# Patient Record
Sex: FEMALE | Race: WHITE | Employment: UNEMPLOYED | URBAN - METROPOLITAN AREA
[De-identification: names, ages, dates, MRNs, and addresses within clinical notes are randomized per-mention and may not be internally consistent; named-entity substitution may affect disease eponyms.]

---

## 2019-08-23 ENCOUNTER — HOSPITAL ENCOUNTER (EMERGENCY)
Age: 29
Discharge: HOME | End: 2019-08-23

## 2019-08-23 VITALS
HEART RATE: 80 BPM | HEIGHT: 64 IN | SYSTOLIC BLOOD PRESSURE: 116 MMHG | BODY MASS INDEX: 25.61 KG/M2 | OXYGEN SATURATION: 98 % | DIASTOLIC BLOOD PRESSURE: 86 MMHG | RESPIRATION RATE: 18 BRPM | TEMPERATURE: 98.1 F | WEIGHT: 150 LBS

## 2019-08-23 DIAGNOSIS — A59.9 TRICHIMONIASIS: Primary | ICD-10-CM

## 2019-08-23 DIAGNOSIS — R31.9 URINARY TRACT INFECTION WITH HEMATURIA, SITE UNSPECIFIED: ICD-10-CM

## 2019-08-23 DIAGNOSIS — N39.0 URINARY TRACT INFECTION WITH HEMATURIA, SITE UNSPECIFIED: ICD-10-CM

## 2019-08-23 LAB
BACTERIA WET PREP: ABNORMAL
BACTERIA: ABNORMAL /HPF
BILIRUBIN URINE: NEGATIVE
BLOOD, URINE: NEGATIVE
CLARITY: CLEAR
CLUE CELLS: ABNORMAL
COLOR: YELLOW
EPITHELIAL CELLS WET PREP: ABNORMAL
EPITHELIAL CELLS, UA: ABNORMAL /HPF
GLUCOSE URINE: NEGATIVE MG/DL
HCG(URINE) PREGNANCY TEST: NEGATIVE
KETONES, URINE: ABNORMAL MG/DL
LEUKOCYTE ESTERASE, URINE: ABNORMAL
MICROSCOPIC EXAMINATION: YES
NITRITE, URINE: NEGATIVE
PH UA: 6.5 (ref 5–8)
PROTEIN UA: NEGATIVE MG/DL
RBC UA: ABNORMAL /HPF (ref 0–2)
RBC WET PREP: ABNORMAL
SOURCE WET PREP: ABNORMAL
SPECIFIC GRAVITY UA: 1.02 (ref 1–1.03)
TRICHOMONAS PREP: ABNORMAL
TRICHOMONAS: PRESENT /HPF
URINE TYPE: ABNORMAL
UROBILINOGEN, URINE: 2 E.U./DL
WBC UA: ABNORMAL /HPF (ref 0–5)
WBC WET PREP: ABNORMAL
YEAST WET PREP: ABNORMAL

## 2019-08-23 PROCEDURE — 6370000000 HC RX 637 (ALT 250 FOR IP): Performed by: PHYSICIAN ASSISTANT

## 2019-08-23 PROCEDURE — 99283 EMERGENCY DEPT VISIT LOW MDM: CPT

## 2019-08-23 PROCEDURE — 96372 THER/PROPH/DIAG INJ SC/IM: CPT

## 2019-08-23 PROCEDURE — 87086 URINE CULTURE/COLONY COUNT: CPT

## 2019-08-23 PROCEDURE — 81001 URINALYSIS AUTO W/SCOPE: CPT

## 2019-08-23 PROCEDURE — 87210 SMEAR WET MOUNT SALINE/INK: CPT

## 2019-08-23 PROCEDURE — 84703 CHORIONIC GONADOTROPIN ASSAY: CPT

## 2019-08-23 PROCEDURE — 6360000002 HC RX W HCPCS: Performed by: PHYSICIAN ASSISTANT

## 2019-08-23 RX ORDER — METRONIDAZOLE 250 MG/1
2000 TABLET ORAL ONCE
Status: COMPLETED | OUTPATIENT
Start: 2019-08-23 | End: 2019-08-23

## 2019-08-23 RX ORDER — AZITHROMYCIN 250 MG/1
1000 TABLET, FILM COATED ORAL ONCE
Status: COMPLETED | OUTPATIENT
Start: 2019-08-23 | End: 2019-08-23

## 2019-08-23 RX ORDER — CEFTRIAXONE 1 G/1
250 INJECTION, POWDER, FOR SOLUTION INTRAMUSCULAR; INTRAVENOUS ONCE
Status: COMPLETED | OUTPATIENT
Start: 2019-08-23 | End: 2019-08-23

## 2019-08-23 RX ORDER — CEPHALEXIN 500 MG/1
500 CAPSULE ORAL 2 TIMES DAILY
Qty: 14 CAPSULE | Refills: 0 | Status: SHIPPED | OUTPATIENT
Start: 2019-08-23 | End: 2019-08-30

## 2019-08-23 RX ORDER — ONDANSETRON 4 MG/1
4 TABLET, ORALLY DISINTEGRATING ORAL EVERY 8 HOURS PRN
Qty: 20 TABLET | Refills: 0 | Status: SHIPPED | OUTPATIENT
Start: 2019-08-23 | End: 2020-02-28

## 2019-08-23 RX ADMIN — CEFTRIAXONE SODIUM 250 MG: 1 INJECTION, POWDER, FOR SOLUTION INTRAMUSCULAR; INTRAVENOUS at 19:19

## 2019-08-23 RX ADMIN — AZITHROMYCIN 1000 MG: 250 TABLET, FILM COATED ORAL at 19:19

## 2019-08-23 RX ADMIN — METRONIDAZOLE 2000 MG: 250 TABLET ORAL at 19:19

## 2019-08-23 ASSESSMENT — PAIN DESCRIPTION - DESCRIPTORS: DESCRIPTORS: PRESSURE

## 2019-08-23 ASSESSMENT — PAIN DESCRIPTION - LOCATION: LOCATION: PELVIS

## 2019-08-23 ASSESSMENT — PAIN SCALES - GENERAL: PAINLEVEL_OUTOF10: 5

## 2019-08-23 ASSESSMENT — PAIN DESCRIPTION - PAIN TYPE: TYPE: ACUTE PAIN

## 2019-08-23 NOTE — ED PROVIDER NOTES
[facility] Emergency Department    CHIEF COMPLAINT  Exposure to STD (pt believes she may have been exposed to an STD. pt reports she has had vaginal odor and discharge x 3 months ); Vaginal Odor; and Vaginal Discharge      HISTORY OF PRESENT ILLNESS  Mook Myers is a 34 y.o. female who presents to the ED complaining of vaginal discharge and odor. Patient observed lying in bed, appears nontoxic and in no acute distress at this time. She is accompanied by fiancé today for evaluation. Patient states that for past several months she has had vaginal discharge and associated smell. Concern for STDs. States that she has had some mild pelvic pain. Denies nausea or vomiting. No diarrhea or constipation. No fevers chills. Last normal menstrual cycle approximately 3 weeks ago. Slight burning with urination. No back pain. No rashes. No chest pain shortness of breath. No headache, lightheadedness, dizziness or confusion. No other complaints, modifying factors or associated symptoms. Nursing notes reviewed. History reviewed. No pertinent past medical history. Past Surgical History:   Procedure Laterality Date    MOUTH SURGERY       History reviewed. No pertinent family history. Social History     Socioeconomic History    Marital status: Single     Spouse name: Not on file    Number of children: Not on file    Years of education: Not on file    Highest education level: Not on file   Occupational History    Not on file   Social Needs    Financial resource strain: Not on file    Food insecurity:     Worry: Not on file     Inability: Not on file    Transportation needs:     Medical: Not on file     Non-medical: Not on file   Tobacco Use    Smoking status: Former Smoker     Packs/day: 1.00     Types: Cigarettes     Last attempt to quit: 8/21/2019    Smokeless tobacco: Never Used   Substance and Sexual Activity    Alcohol use: Not Currently    Drug use:  Yes 2+ (A)     Yeast, Wet Prep None Seen     Clue Cells, Wet Prep None Seen     WBC, Wet Prep 2+     RBC, Wet Prep <1+     Epi Cells 1+     Bacteria 1+     Source Wet Prep Vaginal    Urinalysis   Result Value Ref Range    Color, UA Yellow Straw/Yellow    Clarity, UA Clear Clear    Glucose, Ur Negative Negative mg/dL    Bilirubin Urine Negative Negative    Ketones, Urine TRACE (A) Negative mg/dL    Specific Gravity, UA 1.020 1.005 - 1.030    Blood, Urine Negative Negative    pH, UA 6.5 5.0 - 8.0    Protein, UA Negative Negative mg/dL    Urobilinogen, Urine 2.0 (A) <2.0 E.U./dL    Nitrite, Urine Negative Negative    Leukocyte Esterase, Urine SMALL (A) Negative    Microscopic Examination YES     Urine Type Not Specified    Pregnancy, Urine   Result Value Ref Range    HCG(Urine) Pregnancy Test Negative Detects HCG level >20 MIU/mL   Microscopic Urinalysis   Result Value Ref Range    WBC, UA 6-10 (A) 0 - 5 /HPF    RBC, UA 0-2 0 - 2 /HPF    Epi Cells 3-5 /HPF    Bacteria, UA Rare (A) /HPF    Trichomonas, UA Present (A) /HPF     I estimate there is LOW risk for ACUTE APPENDICITIS, BOWEL OBSTRUCTION, CHOLECYSTITIS, DIVERTICULITIS, INCARCERATED HERNIA, PANCREATITIS, PELVIC INFLAMMATORY DISEASE, PERFORATED BOWEL or ULCER, PREGNANCY, or TUBO-OVARIAN ABSCESS, thus I consider the discharge disposition reasonable. Also, there is no evidence or peritonitis, sepsis, or toxicity. Blossom Poe and I have discussed the diagnosis and risks, and we agree with discharging home to follow-up with their primary doctor. We also discussed returning to the Emergency Department immediately if new or worsening symptoms occur. We have discussed the symptoms which are most concerning (e.g., bloody stool, fever, changing or worsening pain, vomiting) that necessitate immediate return. Final Impression  1. Trichimoniasis    2.  Urinary tract infection with hematuria, site unspecified      Blood pressure 116/86, pulse 80, temperature 98.1 °F

## 2019-08-25 LAB — URINE CULTURE, ROUTINE: NORMAL

## 2020-02-13 ENCOUNTER — HOSPITAL ENCOUNTER (EMERGENCY)
Age: 30
Discharge: HOME | End: 2020-02-13
Attending: EMERGENCY MEDICINE
Payer: COMMERCIAL

## 2020-02-13 VITALS
TEMPERATURE: 97.8 F | RESPIRATION RATE: 18 BRPM | SYSTOLIC BLOOD PRESSURE: 110 MMHG | HEART RATE: 110 BPM | DIASTOLIC BLOOD PRESSURE: 71 MMHG | OXYGEN SATURATION: 98 %

## 2020-02-13 LAB
BILIRUBIN URINE: NEGATIVE
BLOOD, URINE: NEGATIVE
CLARITY: CLEAR
COLOR: YELLOW
GLUCOSE URINE: NEGATIVE MG/DL
HCG(URINE) PREGNANCY TEST: NEGATIVE
KETONES, URINE: NEGATIVE MG/DL
LEUKOCYTE ESTERASE, URINE: NEGATIVE
MICROSCOPIC EXAMINATION: NORMAL
NITRITE, URINE: NEGATIVE
PH UA: 7 (ref 5–8)
PROTEIN UA: NEGATIVE MG/DL
SPECIFIC GRAVITY UA: 1.02 (ref 1–1.03)
URINE REFLEX TO CULTURE: NORMAL
URINE TYPE: NORMAL
UROBILINOGEN, URINE: 1 E.U./DL

## 2020-02-13 PROCEDURE — 6370000000 HC RX 637 (ALT 250 FOR IP): Performed by: EMERGENCY MEDICINE

## 2020-02-13 PROCEDURE — 96372 THER/PROPH/DIAG INJ SC/IM: CPT

## 2020-02-13 PROCEDURE — 84703 CHORIONIC GONADOTROPIN ASSAY: CPT

## 2020-02-13 PROCEDURE — 6360000002 HC RX W HCPCS: Performed by: EMERGENCY MEDICINE

## 2020-02-13 PROCEDURE — 99283 EMERGENCY DEPT VISIT LOW MDM: CPT

## 2020-02-13 PROCEDURE — 81003 URINALYSIS AUTO W/O SCOPE: CPT

## 2020-02-13 PROCEDURE — 2500000003 HC RX 250 WO HCPCS: Performed by: EMERGENCY MEDICINE

## 2020-02-13 RX ORDER — METRONIDAZOLE 500 MG/1
2000 TABLET ORAL ONCE
Qty: 4 TABLET | Refills: 0 | Status: SHIPPED | OUTPATIENT
Start: 2020-02-17 | End: 2020-02-17

## 2020-02-13 RX ORDER — CEFTRIAXONE SODIUM 250 MG/1
INJECTION, POWDER, FOR SOLUTION INTRAMUSCULAR; INTRAVENOUS
Status: DISCONTINUED
Start: 2020-02-13 | End: 2020-02-14 | Stop reason: HOSPADM

## 2020-02-13 RX ORDER — METRONIDAZOLE 250 MG/1
2000 TABLET ORAL ONCE
Status: DISCONTINUED | OUTPATIENT
Start: 2020-02-13 | End: 2020-02-13

## 2020-02-13 RX ORDER — AZITHROMYCIN 250 MG/1
1000 TABLET, FILM COATED ORAL ONCE
Status: COMPLETED | OUTPATIENT
Start: 2020-02-13 | End: 2020-02-13

## 2020-02-13 RX ADMIN — CEFTRIAXONE SODIUM 250 MG: 250 INJECTION, POWDER, FOR SOLUTION INTRAMUSCULAR; INTRAVENOUS at 22:29

## 2020-02-13 RX ADMIN — AZITHROMYCIN MONOHYDRATE 1000 MG: 250 TABLET ORAL at 22:43

## 2020-02-13 ASSESSMENT — PAIN SCALES - GENERAL
PAINLEVEL_OUTOF10: 3
PAINLEVEL_OUTOF10: 0

## 2020-02-14 NOTE — ED PROVIDER NOTES
labs for this visit. Results for orders placed or performed during the hospital encounter of 02/13/20   Pregnancy, urine   Result Value Ref Range    HCG(Urine) Pregnancy Test Negative Detects HCG level >20 MIU/mL   Urinalysis Reflex to Culture   Result Value Ref Range    Color, UA Yellow Straw/Yellow    Clarity, UA Clear Clear    Glucose, Ur Negative Negative mg/dL    Bilirubin Urine Negative Negative    Ketones, Urine Negative Negative mg/dL    Specific Gravity, UA 1.025 1.005 - 1.030    Blood, Urine Negative Negative    pH, UA 7.0 5.0 - 8.0    Protein, UA Negative Negative mg/dL    Urobilinogen, Urine 1.0 <2.0 E.U./dL    Nitrite, Urine Negative Negative    Leukocyte Esterase, Urine Negative Negative    Microscopic Examination Not Indicated     Urine Type NotGiven     Urine Reflex to Culture Not Indicated        RADIOLOGY  I have reviewed all radiographic studies for this visit. No orders to display        ED COURSE/MDM  Patient seen and evaluated. Old records reviewed. Labs and imaging reviewed and results discussed with patient.      34 y.o. female presented with concern for STI exposure. On arrival the patient is mildly tachycardic with otherwise reassuring vital signs. Abdominal exam is benign. The patient defers a vaginal exam.  She states she wishes to be treated empirically for sexually transmitted infection. Her boyfriend is present at bedside and states that he intends to check in for empiric treatment of STI, as well. The patient does complain of dysuria and therefore we will check a urinalysis. Will treat empirically for STI with ceftriaxone, azithromycin. Will prescribe Flagyl as patient states was drinking alcohol tonight. Will communicate precautions re combining with EtOH. Tachycardia resolved with observation in ED. All questions were answered and the patient/family expressed understanding and agreement with the plan.        During the patient's ED course, the patient was given: Medications   cefTRIAXone (ROCEPHIN) 250 mg in lidocaine 1 % 1 mL IM Injection (250 mg Intramuscular Given 2/13/20 2229)   azithromycin (ZITHROMAX) tablet 1,000 mg (1,000 mg Oral Given 2/13/20 2243)        CLINICAL IMPRESSION  1. Vaginal discharge        DISPOSITION   Decision To Discharge 02/13/2020 10:08:42 PM    Condition: stable    Ila Head MD    Note: This chart was created using voice recognition dictation software. Efforts were made by me to ensure accuracy, however some errors may be present due to limitations of this technology and occasionally words are not transcribed correctly.         Ila Head MD  02/16/20 0111

## 2020-02-14 NOTE — ED NOTES
No signs of reaction following IM rocephin,    Discharged from ED with both verbal/typed instructions, explained diagnosis, suggested follow up with health department for repeat or further testing and use of RX, stressed impt of not taking Flagyl within 48hrs of ingestion of alcohol. Verbalized understanding of all given instruction, declines work note,  ambulatory from ED without difficulty, gait steady.       Parris Spaulding, RN  02/13/20 8099

## 2020-02-28 ENCOUNTER — HOSPITAL ENCOUNTER (INPATIENT)
Age: 30
LOS: 10 days | Discharge: HOME | DRG: 753 | End: 2020-03-09
Attending: EMERGENCY MEDICINE | Admitting: PSYCHIATRY & NEUROLOGY
Payer: COMMERCIAL

## 2020-02-28 PROBLEM — F39 MOOD DISORDER (HCC): Status: ACTIVE | Noted: 2020-02-28

## 2020-02-28 LAB
A/G RATIO: 1.5 (ref 1.1–2.2)
ACETAMINOPHEN LEVEL: <5 UG/ML (ref 10–30)
ALBUMIN SERPL-MCNC: 4.8 G/DL (ref 3.4–5)
ALP BLD-CCNC: 53 U/L (ref 40–129)
ALT SERPL-CCNC: 22 U/L (ref 10–40)
AMPHETAMINE SCREEN, URINE: ABNORMAL
ANION GAP SERPL CALCULATED.3IONS-SCNC: 17 MMOL/L (ref 3–16)
AST SERPL-CCNC: 23 U/L (ref 15–37)
BARBITURATE SCREEN URINE: ABNORMAL
BASOPHILS ABSOLUTE: 0 K/UL (ref 0–0.2)
BASOPHILS RELATIVE PERCENT: 0.4 %
BENZODIAZEPINE SCREEN, URINE: ABNORMAL
BILIRUB SERPL-MCNC: 0.4 MG/DL (ref 0–1)
BUN BLDV-MCNC: 8 MG/DL (ref 7–20)
CALCIUM SERPL-MCNC: 9.8 MG/DL (ref 8.3–10.6)
CANNABINOID SCREEN URINE: POSITIVE
CHLORIDE BLD-SCNC: 99 MMOL/L (ref 99–110)
CO2: 21 MMOL/L (ref 21–32)
COCAINE METABOLITE SCREEN URINE: ABNORMAL
CREAT SERPL-MCNC: 0.6 MG/DL (ref 0.6–1.1)
EOSINOPHILS ABSOLUTE: 0.1 K/UL (ref 0–0.6)
EOSINOPHILS RELATIVE PERCENT: 0.9 %
ETHANOL: NORMAL MG/DL (ref 0–0.08)
GFR AFRICAN AMERICAN: >60
GFR NON-AFRICAN AMERICAN: >60
GLOBULIN: 3.3 G/DL
GLUCOSE BLD-MCNC: 96 MG/DL (ref 70–99)
HCG QUALITATIVE: NEGATIVE
HCT VFR BLD CALC: 45.1 % (ref 36–48)
HEMOGLOBIN: 15.4 G/DL (ref 12–16)
LYMPHOCYTES ABSOLUTE: 2 K/UL (ref 1–5.1)
LYMPHOCYTES RELATIVE PERCENT: 29.3 %
Lab: ABNORMAL
MCH RBC QN AUTO: 30.4 PG (ref 26–34)
MCHC RBC AUTO-ENTMCNC: 34.1 G/DL (ref 31–36)
MCV RBC AUTO: 89.2 FL (ref 80–100)
METHADONE SCREEN, URINE: ABNORMAL
MONOCYTES ABSOLUTE: 0.5 K/UL (ref 0–1.3)
MONOCYTES RELATIVE PERCENT: 7.7 %
NEUTROPHILS ABSOLUTE: 4.2 K/UL (ref 1.7–7.7)
NEUTROPHILS RELATIVE PERCENT: 61.7 %
OPIATE SCREEN URINE: ABNORMAL
OXYCODONE URINE: ABNORMAL
PDW BLD-RTO: 13.4 % (ref 12.4–15.4)
PH UA: 5
PHENCYCLIDINE SCREEN URINE: ABNORMAL
PLATELET # BLD: 255 K/UL (ref 135–450)
PMV BLD AUTO: 8.6 FL (ref 5–10.5)
POTASSIUM REFLEX MAGNESIUM: 3.7 MMOL/L (ref 3.5–5.1)
PROPOXYPHENE SCREEN: ABNORMAL
RBC # BLD: 5.06 M/UL (ref 4–5.2)
SALICYLATE, SERUM: 0.4 MG/DL (ref 15–30)
SODIUM BLD-SCNC: 137 MMOL/L (ref 136–145)
TOTAL PROTEIN: 8.1 G/DL (ref 6.4–8.2)
WBC # BLD: 6.9 K/UL (ref 4–11)

## 2020-02-28 PROCEDURE — G0480 DRUG TEST DEF 1-7 CLASSES: HCPCS

## 2020-02-28 PROCEDURE — 80307 DRUG TEST PRSMV CHEM ANLYZR: CPT

## 2020-02-28 PROCEDURE — 99285 EMERGENCY DEPT VISIT HI MDM: CPT

## 2020-02-28 PROCEDURE — 84703 CHORIONIC GONADOTROPIN ASSAY: CPT

## 2020-02-28 PROCEDURE — 80053 COMPREHEN METABOLIC PANEL: CPT

## 2020-02-28 PROCEDURE — 1240000000 HC EMOTIONAL WELLNESS R&B

## 2020-02-28 PROCEDURE — 36415 COLL VENOUS BLD VENIPUNCTURE: CPT

## 2020-02-28 PROCEDURE — 85025 COMPLETE CBC W/AUTO DIFF WBC: CPT

## 2020-02-28 PROCEDURE — 6370000000 HC RX 637 (ALT 250 FOR IP): Performed by: PSYCHIATRY & NEUROLOGY

## 2020-02-28 RX ORDER — OLANZAPINE 5 MG/1
5 TABLET ORAL EVERY 4 HOURS PRN
Status: DISCONTINUED | OUTPATIENT
Start: 2020-02-28 | End: 2020-03-09 | Stop reason: HOSPADM

## 2020-02-28 RX ORDER — TRAZODONE HYDROCHLORIDE 50 MG/1
50 TABLET ORAL NIGHTLY PRN
Status: DISCONTINUED | OUTPATIENT
Start: 2020-02-28 | End: 2020-03-09 | Stop reason: HOSPADM

## 2020-02-28 RX ORDER — DIVALPROEX SODIUM 250 MG/1
250 TABLET, DELAYED RELEASE ORAL 3 TIMES DAILY
Status: DISCONTINUED | OUTPATIENT
Start: 2020-02-28 | End: 2020-03-09 | Stop reason: HOSPADM

## 2020-02-28 RX ORDER — OLANZAPINE 15 MG/1
15 TABLET ORAL NIGHTLY
Status: ON HOLD | COMMUNITY
Start: 2020-01-14 | End: 2020-03-09 | Stop reason: HOSPADM

## 2020-02-28 RX ORDER — ACETAMINOPHEN 325 MG/1
650 TABLET ORAL EVERY 4 HOURS PRN
Status: DISCONTINUED | OUTPATIENT
Start: 2020-02-28 | End: 2020-03-09 | Stop reason: HOSPADM

## 2020-02-28 RX ORDER — SERTRALINE HYDROCHLORIDE 100 MG/1
100 TABLET, FILM COATED ORAL DAILY
Status: ON HOLD | COMMUNITY
Start: 2020-01-15 | End: 2020-03-09 | Stop reason: HOSPADM

## 2020-02-28 RX ORDER — BENZTROPINE MESYLATE 1 MG/ML
2 INJECTION INTRAMUSCULAR; INTRAVENOUS 2 TIMES DAILY PRN
Status: DISCONTINUED | OUTPATIENT
Start: 2020-02-28 | End: 2020-03-09 | Stop reason: HOSPADM

## 2020-02-28 RX ORDER — HYDROXYZINE PAMOATE 50 MG/1
50 CAPSULE ORAL EVERY 6 HOURS PRN
Status: DISCONTINUED | OUTPATIENT
Start: 2020-02-28 | End: 2020-03-09 | Stop reason: HOSPADM

## 2020-02-28 RX ORDER — OLANZAPINE 10 MG/1
10 INJECTION, POWDER, LYOPHILIZED, FOR SOLUTION INTRAMUSCULAR 3 TIMES DAILY PRN
Status: DISCONTINUED | OUTPATIENT
Start: 2020-02-28 | End: 2020-03-09 | Stop reason: HOSPADM

## 2020-02-28 RX ORDER — MAGNESIUM HYDROXIDE/ALUMINUM HYDROXICE/SIMETHICONE 120; 1200; 1200 MG/30ML; MG/30ML; MG/30ML
30 SUSPENSION ORAL EVERY 6 HOURS PRN
Status: DISCONTINUED | OUTPATIENT
Start: 2020-02-28 | End: 2020-03-09 | Stop reason: HOSPADM

## 2020-02-28 RX ORDER — NICOTINE 21 MG/24HR
1 PATCH, TRANSDERMAL 24 HOURS TRANSDERMAL DAILY
Status: DISCONTINUED | OUTPATIENT
Start: 2020-02-28 | End: 2020-03-09 | Stop reason: HOSPADM

## 2020-02-28 RX ADMIN — OLANZAPINE 15 MG: 10 TABLET, FILM COATED ORAL at 20:55

## 2020-02-28 RX ADMIN — DIVALPROEX SODIUM 250 MG: 250 TABLET, DELAYED RELEASE ORAL at 16:20

## 2020-02-28 RX ADMIN — DIVALPROEX SODIUM 250 MG: 250 TABLET, DELAYED RELEASE ORAL at 20:55

## 2020-02-28 ASSESSMENT — SLEEP AND FATIGUE QUESTIONNAIRES
RESTFUL SLEEP: YES
AVERAGE NUMBER OF SLEEP HOURS: 9
DIFFICULTY STAYING ASLEEP: NO
DIFFICULTY FALLING ASLEEP: NO
DO YOU USE A SLEEP AID: NO
DO YOU HAVE DIFFICULTY SLEEPING: NO
DIFFICULTY ARISING: NO

## 2020-02-28 ASSESSMENT — LIFESTYLE VARIABLES: HISTORY_ALCOHOL_USE: NO

## 2020-02-28 ASSESSMENT — PAIN SCALES - GENERAL: PAINLEVEL_OUTOF10: 0

## 2020-02-28 NOTE — ED NOTES
Patient noted to not speak when her boyfriend Mary Lau in the room. Mary Lau spoke for patient despite attempting to speak with patient. When told she would be admitted she nodded her head in agreement to which he responded \"but do you want to stay or do you want to just get the medicine and go home, because she only really needs the medicine. \"  Explained to patient why she was being admitted to which she shook her head in agreement again. He then became defensive stating the patient is not suicidal and never has been. There is inconsistencies in patients story and Mary Lau story. According to patient they are just friends and she is homeless. According to Mary Lau they are together and she is his fiance and she has been staying with him. Patient avoiding eye contact.        Anthony Soriano RN  02/28/20 1400

## 2020-02-28 NOTE — ED NOTES
Collateral Contact:  Name: Anatoliy Mosqueda    Relation to Patient: Boyfriend of 2.5 years off and on  Last Contact with Patient: Today  Concerns: Anatoliy Mosqueda states that patient was recently checked into a \"mental hospital\" around [place] but is unable to identify where she was admitted. He states she was put on two medications but is unsure what those medications are called. He states he has been around the patient pretty consistently for the past 3 weeks and states her depressive symptoms have progressed over the last couple of days. States has been off medications for around 2 weeks. States patient has voiced she is depressed because she has no job and feels \"her life is going nowhere. \"  States she has voiced having suicidal thought but no plan or intent to him. Anatoliy Mosqueda states he feels Cortez Stringer needs to get restarted on psychiatric medications she was on and he would feel safe with her coming home.        Rusty Carranza RN  02/28/20 2318
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
## 2020-02-28 NOTE — BH NOTE
`[facility]  Admission Note     Admission Type:   Admission Type: Involuntary    Reason for admission:  Reason for Admission: Patient with mood lability, disorganized thoughts, evasive and gaurded, voices AH, racing thoughts, tearful, increased agitation. 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 No pertinent past medical history.     Status EXAM:  Status and Exam  Normal: No  Facial Expression: Avoids Gaze(labile)  Affect: Unstable  Level of Consciousness: Alert  Mood:Normal: No  Mood: Depressed, Labile  Motor Activity:Normal: No  Motor Activity: Decreased  Interview Behavior: Evasive  Preception: [place] to Person, Bertis Handy to Time, [place] to Place  Attention:Normal: Yes  Thought Processes: Tangential  Thought Content:Normal: Yes  Hallucinations: None, Auditory (Comment)(patient denies however slow to respond and states hear voice)  Delusions: No  Memory:Normal: Yes  Insight and Judgment: No  Insight and Judgment: Poor Judgment, Poor Insight  Present Suicidal Ideation: No(per pt no, however on arrival 2 hours ago she was SI w/plan)  Present Homicidal Ideation: No    Tobacco Screening:  Practical Counseling, on admission, shruti X, if applicable and completed (first 3 are required if patient doesn't refuse):            (X)  Recognizing danger situations (included triggers and roadblocks) (X)  Coping skills (new ways to manage stress, exercise, relaxation techniques, changing routine, distraction)                                                           (X)  Basic information about quitting (benefits of quitting, techniques in how to quit, available resources  ( ) Referral for counseling faxed to [facility]   ( ) Patient refused counseling  ( ) Patient has not smoked in the last 30 days    Metabolic Screening:    No results found for: LABA1C    No results found for: CHOL  No results found for: TRIG  No results found for: HDL  No components found for: LDLCAL  No results found for: LABVLDL      Body mass index is 25.75 kg/m². BP Readings from Last 2 Encounters:   02/28/20 118/83   02/13/20 110/71       Pt admitted with followings belongings:  Dentures: None  Vision - Corrective Lenses: None  Hearing Aid: None  Jewelry: None  Body Piercings Removed: No  Clothing: Pants, Shirt, Socks  Were All Patient Medications Collected?: Not Applicable  Other Valuables: None     Valuables sent home with patient. Patient oriented to surroundings and program expectations and copy of patient rights given. Received admission packet:  yes. Consents reviewed, signed yes. Refused none. Patient verbalize understanding:  yes.     Patient education on precautions: yes                   Jerica Lawton RN

## 2020-02-28 NOTE — ED PROVIDER NOTES
Talks on phone: Not on file     Gets together: Not on file     Attends Anabaptism service: Not on file     Active member of club or organization: Not on file     Attends meetings of clubs or organizations: Not on file     Relationship status: Not on file    Intimate partner violence:     Fear of current or ex partner: Not on file     Emotionally abused: Not on file     Physically abused: Not on file     Forced sexual activity: Not on file   Other Topics Concern    Not on file   Social History Narrative    Not on file     No current facility-administered medications for this encounter. Current Outpatient Medications   Medication Sig Dispense Refill    ondansetron (ZOFRAN ODT) 4 MG disintegrating tablet Take 1 tablet by mouth every 8 hours as needed for Nausea or Vomiting 20 tablet 0     No Known Allergies    REVIEW OF SYSTEMS  10 systems reviewed, pertinent positives and negatives per HPI, otherwise noted to be negative. PHYSICAL EXAM  ED Triage Vitals [02/28/20 1139]   Enc Vitals Group      BP (!) 162/102      Pulse 98      Resp 14      Temp 97.3 °F (36.3 °C)      Temp Source Tympanic      SpO2 99 %      Weight 150 lb (68 kg)      Height 5' 4\" (1.626 m)      Head Circumference       Peak Flow       Pain Score       Pain Loc       Pain Edu? Excl. in [address]? General appearance: Awake and alert. Cooperative. No acute distress. HENT: Normocephalic. Atraumatic. Mucous membranes are moist.  Neck: Supple. Eyes: PERRL. EOMI. Heart/Chest: RRR. No murmurs. Lungs: Respirations unlabored. CTAB. Good air exchange. Speaking comfortably in full sentences. Abdomen: Soft. Non-tender. Non-distended. No rebound or guarding. Musculoskeletal: No extremity edema. No deformity. No tenderness in the extremities. All extremities neurovascularly intact. Skin: Warm and dry. Superficial laceration anterior aspect left wrist, hemostatic. No evidence of superinfection   Neurological: Alert and oriented.  CN II-XII intact. Strength 5/5 bilateral upper and lower extremities. Sensation intact to light touch. Gait normal.  Psychiatric: Mood/affect: depressed    LABS  I have reviewed all labs for this visit. Results for orders placed or performed during the hospital encounter of 02/28/20   Urine Drug Screen   Result Value Ref Range    Amphetamine Screen, Urine Neg Negative <1000ng/mL    Barbiturate Screen, Ur Neg Negative <200 ng/mL    Benzodiazepine Screen, Urine Neg Negative <200 ng/mL    Cannabinoid Scrn, Ur POSITIVE (A) Negative <50 ng/mL    Cocaine Metabolite Screen, Urine Neg Negative <300 ng/mL    Opiate Scrn, Ur Neg Negative <300 ng/mL    PCP Screen, Urine Neg Negative <25 ng/mL    Methadone Screen, Urine Neg Negative <300 ng/mL    Propoxyphene Scrn, Ur Neg Negative <300 ng/mL    Oxycodone Urine Neg Negative <100 ng/ml    pH, UA 5.0     Drug Screen Comment: see below          ED COURSE/MDM  Patient seen and evaluated. Old records reviewed. Labs and imaging reviewed and results discussed with patient/family to extent possible.      34 y.o. female presents with depression and suicidal ideation. Vitals stable. Patient manifests no clinically apparent toxidrome. Evidence of cutting behavior, no indication for repair, not concerning for infection. Tetanus status up to date. Plan is usual screening psychiatric laboratory studies. If reassuring, anticipate medical clearance with final disposition per EDWARD. The patient proved difficult to obtain laboratory studies. I was not informed of this and the patient was ultimately deemed to require admission. Prior to my medical clearance and while I was managing a critically ill patient she was brought upstairs to the psychiatric unit. Upon discovering this, I did notify the [facility] staff and informed them that she would need phlebotomy or a nurse with an ultrasound or even emergency department staff to obtain laboratory studies in order to medically clear her.   The nurse did

## 2020-02-29 LAB
CHOLESTEROL, TOTAL: 217 MG/DL (ref 0–199)
EKG ATRIAL RATE: 69 BPM
EKG DIAGNOSIS: NORMAL
EKG P AXIS: 44 DEGREES
EKG P-R INTERVAL: 112 MS
EKG Q-T INTERVAL: 410 MS
EKG QRS DURATION: 84 MS
EKG QTC CALCULATION (BAZETT): 439 MS
EKG R AXIS: 59 DEGREES
EKG T AXIS: 31 DEGREES
EKG VENTRICULAR RATE: 69 BPM
HDLC SERPL-MCNC: 44 MG/DL (ref 40–60)
LDL CHOLESTEROL CALCULATED: 142 MG/DL
TRIGL SERPL-MCNC: 155 MG/DL (ref 0–150)
VLDLC SERPL CALC-MCNC: 31 MG/DL

## 2020-02-29 PROCEDURE — 6370000000 HC RX 637 (ALT 250 FOR IP): Performed by: PSYCHIATRY & NEUROLOGY

## 2020-02-29 PROCEDURE — 80061 LIPID PANEL: CPT

## 2020-02-29 PROCEDURE — 1240000000 HC EMOTIONAL WELLNESS R&B

## 2020-02-29 PROCEDURE — 93010 ELECTROCARDIOGRAM REPORT: CPT | Performed by: INTERNAL MEDICINE

## 2020-02-29 PROCEDURE — 36415 COLL VENOUS BLD VENIPUNCTURE: CPT

## 2020-02-29 PROCEDURE — 99223 1ST HOSP IP/OBS HIGH 75: CPT | Performed by: PSYCHIATRY & NEUROLOGY

## 2020-02-29 PROCEDURE — 99222 1ST HOSP IP/OBS MODERATE 55: CPT | Performed by: PHYSICIAN ASSISTANT

## 2020-02-29 PROCEDURE — 83036 HEMOGLOBIN GLYCOSYLATED A1C: CPT

## 2020-02-29 PROCEDURE — 93005 ELECTROCARDIOGRAM TRACING: CPT | Performed by: PSYCHIATRY & NEUROLOGY

## 2020-02-29 RX ORDER — FLUOXETINE 10 MG/1
10 CAPSULE ORAL DAILY
Status: DISCONTINUED | OUTPATIENT
Start: 2020-02-29 | End: 2020-03-02

## 2020-02-29 RX ADMIN — DIVALPROEX SODIUM 250 MG: 250 TABLET, DELAYED RELEASE ORAL at 08:48

## 2020-02-29 RX ADMIN — HYDROXYZINE PAMOATE 50 MG: 50 CAPSULE ORAL at 13:25

## 2020-02-29 RX ADMIN — DIVALPROEX SODIUM 250 MG: 250 TABLET, DELAYED RELEASE ORAL at 13:25

## 2020-02-29 RX ADMIN — FLUOXETINE 10 MG: 10 CAPSULE ORAL at 16:03

## 2020-02-29 RX ADMIN — OLANZAPINE 15 MG: 10 TABLET, FILM COATED ORAL at 21:35

## 2020-02-29 RX ADMIN — TRAZODONE HYDROCHLORIDE 50 MG: 50 TABLET ORAL at 21:36

## 2020-02-29 RX ADMIN — DIVALPROEX SODIUM 250 MG: 250 TABLET, DELAYED RELEASE ORAL at 21:35

## 2020-02-29 RX ADMIN — OLANZAPINE 5 MG: 5 TABLET, FILM COATED ORAL at 13:24

## 2020-02-29 ASSESSMENT — PATIENT HEALTH QUESTIONNAIRE - PHQ9: SUM OF ALL RESPONSES TO PHQ QUESTIONS 1-9: 23

## 2020-02-29 ASSESSMENT — SLEEP AND FATIGUE QUESTIONNAIRES
DO YOU USE A SLEEP AID: NO
DIFFICULTY ARISING: NO
SLEEP PATTERN: NORMAL
DIFFICULTY FALLING ASLEEP: NO
DIFFICULTY STAYING ASLEEP: NO
AVERAGE NUMBER OF SLEEP HOURS: 8
DO YOU HAVE DIFFICULTY SLEEPING: NO
RESTFUL SLEEP: YES

## 2020-02-29 ASSESSMENT — PAIN SCALES - GENERAL: PAINLEVEL_OUTOF10: 0

## 2020-02-29 ASSESSMENT — LIFESTYLE VARIABLES: HISTORY_ALCOHOL_USE: YES
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
## 2020-02-29 NOTE — PRE-CERTIFICATION NOTE
No insurance info available (pt asleep so unable to ask if she has insurance) on face sheet or in pt belongings so no precert. Siobhan Clayton (ARNALDO) aware.

## 2020-02-29 NOTE — BH NOTE
SW completed psychosocial, AT/OT, and CSSR-S assessment with pt. Pt was very tearful and said that the only deterrent from her dying by suicide is that she feels it's immoral. Pt is currently homeless with no job. Pt contracts for safety at this time.

## 2020-02-29 NOTE — BH NOTE
[facility]  Initial Interdisciplinary Treatment Plan NOTE    Review Date & Time: 02/29/2020 0900    Patient was not in treatment team    Admission Type:   Admission Type: Involuntary    Reason for admission:  Reason for Admission: Patient with mood lability, disorganized thoughts, evasive and gaurded, voices AH, racing thoughts, tearful, increased agitation. Thoughts of SI, superficial lac on left forearm      Estimated Length of Stay Update:  1-3 days   Estimated Discharge Date Update: 1-3 days     PATIENT STRENGTHS:  Patient Strengths Strengths: No significant Physical Illness  Patient Strengths and Limitations:Limitations: Lacks leisure interests, Difficult relationships / poor social skills, Apathetic / unmotivated(boyfriend speaks for her)  Addictive Behavior:Addictive Behavior  In the past 3 months, have you felt or has someone told you that you have a problem with:  : None  Do you have a history of Chemical Use?: No  Do you have a history of Alcohol Use?: No  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 No pertinent past medical history.     EDUCATION:   Learner Progress Toward Treatment Goals: Reviewed results and recommendations of this team    Method: Individual    Outcome: Verbalized understanding    PATIENT GOALS: talk with the doctor     PLAN/TREATMENT RECOMMENDATIONS UPDATE: maintain safety, medication management     GOALS UPDATE:   Time frame for Short-Term Goals:  By time of discharge     Autumn Grimes RN

## 2020-03-01 LAB
ESTIMATED AVERAGE GLUCOSE: 91.1 MG/DL
HBA1C MFR BLD: 4.8 %

## 2020-03-01 PROCEDURE — 6370000000 HC RX 637 (ALT 250 FOR IP): Performed by: PSYCHIATRY & NEUROLOGY

## 2020-03-01 PROCEDURE — 1240000000 HC EMOTIONAL WELLNESS R&B

## 2020-03-01 RX ADMIN — DIVALPROEX SODIUM 250 MG: 250 TABLET, DELAYED RELEASE ORAL at 15:18

## 2020-03-01 RX ADMIN — DIVALPROEX SODIUM 250 MG: 250 TABLET, DELAYED RELEASE ORAL at 08:56

## 2020-03-01 RX ADMIN — FLUOXETINE 10 MG: 10 CAPSULE ORAL at 08:56

## 2020-03-01 RX ADMIN — DIVALPROEX SODIUM 250 MG: 250 TABLET, DELAYED RELEASE ORAL at 21:45

## 2020-03-01 RX ADMIN — OLANZAPINE 15 MG: 10 TABLET, FILM COATED ORAL at 21:45

## 2020-03-01 NOTE — PLAN OF CARE
Problem: Altered Mood, Depressive Behavior:  Goal: Able to verbalize and/or display a decrease in depressive symptoms  Description  Verbalize a slight decrease in depressive symptoms; attempted to join a group but was overwhelmed and returned to her room, encouraged her to try again tomorrow but that it was great that she tried to go; NEHEMIAS betts SI, AVH.  3/1/2020 0242 by Vijaya Gardner RN  Outcome: Ongoing     Problem: Altered Mood, Depressive Behavior:  Goal: Absence of self-harm  Description  Absence of self-harm to current time and contracts for safety.   Outcome: Ongoing

## 2020-03-01 NOTE — BH NOTE
Visitor Katia Melissa) showed back up in the lobby and was requesting that we provide him an excuse for work that he had to miss work due to his girlfriend. Charge nurse, security and  Luis Ramesh met him in [facility] and he began perseverating again on being able to get in to visit Nikunj Spencer. Rob Lopez became agitated with staff and accused charge nurse of taking this personal against him. He was informed that he would have to obtain a work excuse from his own PCP if needed. Writer phoned Dr. Cori Gonzalez and informed her of situation. Order received for no visitors and no phone calls for Nikunj Spencer until further notice. Rob Lopez was informed of this by charge nurse. Nikunjaddy Pringle is not aware that Rob Lopez returned to [facility]. She is currently showering.

## 2020-03-01 NOTE — BH NOTE
Writer accompanied Kim Lopes (visitor) off unit after patient immediately asked him to leave once she saw that he was here. Writer witnessed patient telling Kim Lopes to leave and he attempted to follow her towards room and denied that he was asked to leave by patient. Writer spoke at length to visitor Chanel Rene) off the unit, as I accompanied him towards the lobby. He describes \"being all that she has\" and that things were \"good between them until she started to decompensate\". Writer explained rules on unit and that patients can decline phone calls and visitors at any time. It was difficult to get Kim Lopes out of inpatient area, at one point he even sat down and perseverated on not being able to visit her. Kim Lopes asked me to give him her patient code, which I declined to do so and he has already been informed that we cannot give that out and that it needs to be given by the patient. Kim Lopes did enter the lobby elevator without escalating further. Writer approached Nickolas Pina and she again, is requesting no calls or visits from Kim Lopes. [facility] staff made aware of this and charge nurse is aware of conversation in the event we want to contact the physician on-call for visiting restrictions until further notice.

## 2020-03-01 NOTE — BH NOTE
Pt informed staff that she did want to see her fiance Debbie Covington who has been calling numerous times a day and has been coming to every previous visitation and has NOT been allowed in because pt has told staff previously that she didn't want to see him or talk to him. Fiance came to see pt during day time visitation after telling staff she wanted to see him, and then as soon as pt saw sri, she told him that she didn't want to see him and that she wanted him to leave. Fuadance started to follow pt into her room, nursing staff followed him, stopped him and informed sri that he needed to leave because pt said he needed to. At first sri lied about being told to leave until nurse confronted sri and told him she heard pt tell him that she didn't want to see him and to leave. Sri then left the unit with nursing staff. Pt is currently eating meal in room, remaining isolative. Will continue to monitor.
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
## 2020-03-02 PROCEDURE — 6370000000 HC RX 637 (ALT 250 FOR IP): Performed by: PSYCHIATRY & NEUROLOGY

## 2020-03-02 PROCEDURE — 97165 OT EVAL LOW COMPLEX 30 MIN: CPT

## 2020-03-02 PROCEDURE — 1240000000 HC EMOTIONAL WELLNESS R&B

## 2020-03-02 PROCEDURE — 97535 SELF CARE MNGMENT TRAINING: CPT

## 2020-03-02 PROCEDURE — 99233 SBSQ HOSP IP/OBS HIGH 50: CPT | Performed by: PSYCHIATRY & NEUROLOGY

## 2020-03-02 RX ORDER — OLANZAPINE 5 MG/1
5 TABLET ORAL DAILY
Status: DISCONTINUED | OUTPATIENT
Start: 2020-03-02 | End: 2020-03-03

## 2020-03-02 RX ORDER — FLUOXETINE HYDROCHLORIDE 20 MG/1
20 CAPSULE ORAL DAILY
Status: DISCONTINUED | OUTPATIENT
Start: 2020-03-03 | End: 2020-03-04

## 2020-03-02 RX ORDER — OLANZAPINE 10 MG/1
20 TABLET ORAL NIGHTLY
Status: DISCONTINUED | OUTPATIENT
Start: 2020-03-02 | End: 2020-03-04

## 2020-03-02 RX ADMIN — DIVALPROEX SODIUM 250 MG: 250 TABLET, DELAYED RELEASE ORAL at 14:12

## 2020-03-02 RX ADMIN — OLANZAPINE 20 MG: 10 TABLET, FILM COATED ORAL at 20:02

## 2020-03-02 RX ADMIN — DIVALPROEX SODIUM 250 MG: 250 TABLET, DELAYED RELEASE ORAL at 08:36

## 2020-03-02 RX ADMIN — FLUOXETINE 10 MG: 10 CAPSULE ORAL at 08:36

## 2020-03-02 RX ADMIN — OLANZAPINE 5 MG: 5 TABLET, FILM COATED ORAL at 14:12

## 2020-03-02 RX ADMIN — DIVALPROEX SODIUM 250 MG: 250 TABLET, DELAYED RELEASE ORAL at 20:02

## 2020-03-02 ASSESSMENT — PAIN DESCRIPTION - DESCRIPTORS: DESCRIPTORS: ACHING

## 2020-03-02 ASSESSMENT — PAIN DESCRIPTION - LOCATION: LOCATION: BACK

## 2020-03-02 ASSESSMENT — PAIN SCALES - GENERAL: PAINLEVEL_OUTOF10: 2

## 2020-03-02 ASSESSMENT — PAIN - FUNCTIONAL ASSESSMENT: PAIN_FUNCTIONAL_ASSESSMENT: ACTIVITIES ARE NOT PREVENTED

## 2020-03-02 ASSESSMENT — PAIN DESCRIPTION - PAIN TYPE: TYPE: CHRONIC PAIN

## 2020-03-02 ASSESSMENT — PAIN DESCRIPTION - FREQUENCY: FREQUENCY: INTERMITTENT

## 2020-03-02 NOTE — PROGRESS NOTES
Cognition     Plan: To be seen 2-5x/week while in acute care setting for therapeutic exercises/act, ADL retraining, NMR and patient/caregiver ed/instruction.      Timed Code Treatment Minutes:  9   minutes    Total Treatment Time:    19  minutes    Signature and License Number      Maria Reyes OTR/L  #[id number]      If patient discharges from this facility prior to next visit, this note will serve as the Discharge Summary

## 2020-03-02 NOTE — BH NOTE
No  Thought Content: Poverty of Content  Hallucinations: None  Delusions: No  Memory:Normal: Yes  Memory: Poor Remote  Insight and Judgment: No  Insight and Judgment: Poor Judgment, Poor Insight  Present Suicidal Ideation: No  Present Homicidal Ideation: No    Daily Assessment Last Entry:   Daily Sleep (WDL): Within Defined Limits         Patient Currently in Pain: Yes(back)  Daily Nutrition (WDL): Within Defined Limits    Patient Monitoring:  Frequency of Checks: 4 times per hour, close    Psychiatric Symptoms:   Depression Symptoms  Depression Symptoms: Impaired concentration, Isolative  Anxiety Symptoms  Anxiety Symptoms: Generalized  Elida Symptoms  Elida Symptoms: Poor judgment     Psychosis Symptoms  Delusion Type: No problems reported or observed. Suicide Risk CSSR-S:  1) Within the past month, have you wished you were dead or wished you could go to sleep and not wake up? : No(patient declines suicidal ideation but states suicidal in triage)  2) Have you actually had any thoughts of killing yourself? : No  3) Have you been thinking about how you might kill yourself? : No  5) Have you started to work out or worked out the details of how to kill yourself? Do you intend to carry out this plan? : No  6) Have you ever done anything, started to do anything, or prepared to do anything to end your life?: Yes(cut left wrist)  Change in Resultnone Change in Plan of carenone      EDUCATION:   Learner Progress Toward Treatment Goals: Reviewed goals and plan of care    Method: Individual    Outcome: Verbalized understanding    PATIENT GOALS: Did not make goal at this time. PLAN/TREATMENT RECOMMENDATIONS UPDATE:Continue treatment and medication mangment.      GOALS UPDATE:   Time frame for Short-Term Goals: 2 days      Elvie Mendez RN
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
## 2020-03-02 NOTE — GROUP NOTE
Group Therapy Note    Date: 3/2/2020    Group Start Time: 5153  Group End Time: 1610  Group Topic: Healthy Living/Wellness    Naomi, [address] [facility]        Group Therapy Note    Attendees: 8             Notes:  Self Awareness group        Modes of Intervention: Support and Socialization      Discipline Responsible: Registered Nurse      Signature:  Siomara Soares RN

## 2020-03-02 NOTE — GROUP NOTE
Group Therapy Note    Date: 3/2/2020    Group Start Time: 1000  Group End Time: 1100  Group Topic: Cognitive Skills    [address] [facility]        Group Therapy Note    Attendees: 15         Patient's Goal:  Patients were invited to participate in a Cognitive Skills / Art Therapy group on self-care and stress-reduction through art making. Patients were encouraged to engage in creating an art piece, with an art material of their choosing, to reflect on what creative outlet they have, or would like to add, in their lives to build resilience and an opportunity for stress reduction. Patients were also asked to monitor their feelings and energy level at the beginning, middle, and end of group to encourage self-awareness and mood monitoring in response to the activity. At the end of session, patients were encouraged to share and process their work with the group. Notes:  Rabia Escobar sat on the floor away from peers while actively participating in art making and shared her artwork with the group before leaving a few minutes early. Status After Intervention:  Unchanged    Participation Level:  Active Listener    Participation Quality: Appropriate, Attentive and Sharing      Speech:  pressured      Thought Process/Content: Perseverating      Affective Functioning: Constricted/Restricted      Mood: anxious and irritable      Level of consciousness:  Alert and Preoccupied      Response to Learning: Able to verbalize current knowledge/experience, Able to verbalize/acknowledge new learning, Able to retain information    Endings: None Reported    Modes of Intervention: Education, Support, Socialization, Exploration and Activity      Discipline Responsible: Psychoeducational Specialist      Signature:  Theresa Maki, [address] [facility]

## 2020-03-02 NOTE — GROUP NOTE
Group Therapy Note    Date: 3/2/2020    Group Start Time: 1115  Group End Time: 1200  Group Topic: Psychotherapy    [facility]    Padmaja Rodriguez, [facility]        Group Therapy Note    Attendees: 8       Patient's Goal:  Pt will improve interpersonal interactions through group processing and agenda setting.      Notes:  Pt left group without setting a goal.     Discipline Responsible: /Counselor      Signature:  Padmaja Rodriguez, [facility]

## 2020-03-03 PROCEDURE — 97535 SELF CARE MNGMENT TRAINING: CPT

## 2020-03-03 PROCEDURE — 99233 SBSQ HOSP IP/OBS HIGH 50: CPT | Performed by: PSYCHIATRY & NEUROLOGY

## 2020-03-03 PROCEDURE — 6370000000 HC RX 637 (ALT 250 FOR IP): Performed by: PSYCHIATRY & NEUROLOGY

## 2020-03-03 PROCEDURE — 1240000000 HC EMOTIONAL WELLNESS R&B

## 2020-03-03 RX ORDER — OLANZAPINE 10 MG/1
10 TABLET ORAL DAILY
Status: DISCONTINUED | OUTPATIENT
Start: 2020-03-04 | End: 2020-03-04

## 2020-03-03 RX ADMIN — DIVALPROEX SODIUM 250 MG: 250 TABLET, DELAYED RELEASE ORAL at 13:52

## 2020-03-03 RX ADMIN — DIVALPROEX SODIUM 250 MG: 250 TABLET, DELAYED RELEASE ORAL at 08:12

## 2020-03-03 RX ADMIN — OLANZAPINE 20 MG: 10 TABLET, FILM COATED ORAL at 20:47

## 2020-03-03 RX ADMIN — OLANZAPINE 5 MG: 5 TABLET, FILM COATED ORAL at 15:57

## 2020-03-03 RX ADMIN — TRAZODONE HYDROCHLORIDE 50 MG: 50 TABLET ORAL at 20:47

## 2020-03-03 RX ADMIN — FLUOXETINE 20 MG: 20 CAPSULE ORAL at 08:13

## 2020-03-03 RX ADMIN — OLANZAPINE 5 MG: 5 TABLET, FILM COATED ORAL at 08:12

## 2020-03-03 RX ADMIN — DIVALPROEX SODIUM 250 MG: 250 TABLET, DELAYED RELEASE ORAL at 20:47

## 2020-03-03 ASSESSMENT — PAIN SCALES - GENERAL: PAINLEVEL_OUTOF10: 0

## 2020-03-03 NOTE — PROGRESS NOTES
Inpatient Occupational Therapy Treatment    Unit:  [facility]  Date:  3/3/2020  Patient Name:    Syeda Larson  Ad[facility]:  [facility]  Admit Date:  2/28/2020  Precautions/Restrictions/WB Status/ Lines/ Wounds/ Oxygen:  No visitors and no telephone calls until further notice; up as tolerated; Hepatitis C   Treatment Time:  15:33-15:50  Treatment Number:   2    Patient Goals for Therapy: \"Figure out where I want to go. \"      Discharge Recommendations:     [x] Home with assist prn     DME needs for discharge:   NT     AM-PAC Score: 24     Home Health S4 Level: [x] NA     ACLS:  TBA    Pain  []Yes  [x]No  Rating:  Location:  Pain Medicine Status: [x] Denies need  [] Pain med requested  [] RN notified. Cognition    A&Ox4, patient appropriate and cooperative. Follows []1 step and [x] 3 step commands. Assessment:   Pt participated in ADL retraining/Coping skills. Pt. Educated on Coping Skills/coping categories. Pt. Educated on self-soothing, distraction, and emotional awareness activities. Pt. Initially participated well then became distracted with visual hallucinations. Pt. Verbalized over 3 new coping skill prior to writer terminating treatment session secondary to pt's increased agitation. Writer notified pt's nurse after treatment session. Nsg followed up with pt. Pt. Presented as satisfied with coloring activity out in gathering room. Pt. Limited during tx by decreased cognition. At end of tx, pt. In room. Goal(s) : To be met in 3 Visits:  1). Pt. To complete ACLS. 2). Pt. To verbalize understanding of sleep hygiene education. To be met in 5 Visits:  1). Pt. To complete 1 SMART long term goal and 2 SMART short term goals with mod assist.  2). Pt. To verbalize 3 new coping skills. 3). Pt. To complete interest check list.    4). Pt. To verbalize understanding of 3 communication styles. 5). Pt. To complete wellness plan. 6).  Pt. To complete a daily schedule

## 2020-03-03 NOTE — BH NOTE
Group Therapy Note    Date: 3/2/2020  Start Time: 20:00  End Time:  21:00  Number of Participants: 9    Type of Group: Recreational  Wrap up    Samuel Alexis Information  Module Name:  /  Session Number:  /    Patient's Goal:  Go to group    Notes:  Goal completed    Status After Intervention:  Unchanged    Participation Level:  Active Listener and Interactive    Participation Quality: Appropriate and Attentive      Speech:  normal      Thought Process/Content: Logical      Affective Functioning: Blunted      Mood: anxious      Level of consciousness:  Alert and Attentive      Response to Learning: Able to change behavior      Endings: None Reported    Modes of Intervention: Socialization and Problem-solving      Discipline Responsible: Behavorial Health Tech      Signature:  Johnnie Sykes

## 2020-03-03 NOTE — PROGRESS NOTES
02/29/2020 155* 0 - 150 mg/dL Final    HDL 02/29/2020 44  40 - 60 mg/dL Final    LDL Calculated 02/29/2020 142* <100 mg/dL Final    VLDL Cholesterol Calculated 02/29/2020 31  Not Established mg/dL Final    Hemoglobin A1C 02/29/2020 4.8  See comment % Final    Comment: Comment:  Diagnosis of Diabetes: > or = 6.5%  Increased risk of diabetes (Prediabetes): 5.7-6.4%  Glycemic Control: Nonpregnant Adults: <7.0%                    Pregnant: <6.0%        eAG 02/29/2020 91.1  mg/dL Final    Ventricular Rate 02/29/2020 69  BPM Final    Atrial Rate 02/29/2020 69  BPM Final    P-R Interval 02/29/2020 112  ms Final    QRS Duration 02/29/2020 84  ms Final    Q-T Interval 02/29/2020 410  ms Final    QTc Calculation (Bazett) 02/29/2020 439  ms Final    P Axis 02/29/2020 44  degrees Final    R Axis 02/29/2020 59  degrees Final    T Axis 02/29/2020 31  degrees Final    Diagnosis 02/29/2020 Normal sinus rhythmNormal ECGNo previous ECGs availableConfirmed by Shane Michelle MD, Kori Lopez ([id number]) on 2/29/2020 4:03:12 PM   Final            Medications  Current Facility-Administered Medications: [START ON 3/4/2020] OLANZapine (ZYPREXA) tablet 10 mg, 10 mg, Oral, Daily  FLUoxetine (PROZAC) capsule 20 mg, 20 mg, Oral, Daily  OLANZapine (ZYPREXA) tablet 20 mg, 20 mg, Oral, Nightly  divalproex (DEPAKOTE) DR tablet 250 mg, 250 mg, Oral, TID  acetaminophen (TYLENOL) tablet 650 mg, 650 mg, Oral, Q4H PRN  hydrOXYzine (VISTARIL) capsule 50 mg, 50 mg, Oral, Q6H PRN  OLANZapine (ZYPREXA) tablet 5 mg, 5 mg, Oral, Q4H PRN **OR** OLANZapine (ZYPREXA) injection 10 mg, 10 mg, Intramuscular, TID PRN  sterile water injection 2.1 mL, 2.1 mL, Intramuscular, Q4H PRN  traZODone (DESYREL) tablet 50 mg, 50 mg, Oral, Nightly PRN  benztropine mesylate (COGENTIN) injection 2 mg, 2 mg, Intramuscular, BID PRN  magnesium hydroxide (MILK OF MAGNESIA) 400 MG/5ML suspension 30 mL, 30 mL, Oral, Daily PRN  aluminum & magnesium hydroxide-simethicone (Juanna Alder) 879-949-48 MG/5ML suspension 30 mL, 30 mL, Oral, Q6H PRN  nicotine (NICODERM CQ) 21 MG/24HR 1 patch, 1 patch, Transdermal, Daily    ASSESSMENT AND PLAN    Active Problems:    Mood disorder (HCC)    Cannabis abuse    Heroin abuse ([facility]    Hepatitis C antibody positive in blood    Tobacco dependence    IV drug abuse ([facility]  Resolved Problems:    * No resolved hospital problems. *       1. Patient s symptoms   show no change  2. Probable discharge is 3-5 days  3. Discharge planning is incomplete  4 Suicidal ideation is unchanged  5 I spent 35 minutes face to face and more than 50 % was spent coordinating care

## 2020-03-03 NOTE — GROUP NOTE
Group Therapy Note    Date: 3/3/2020    Group Start Time: 0130  Group End Time: 0115  Group Topic: Cognitive Skills    [address] [facility]        Group Therapy Note    Attendees: 12         Patient's Goal:  Patients were invited to participated in a Cognitive Skills / Art Therapy group on Dr. Arminda Ortiz \"Window of Tolerance.  Patients received a handout on the Window of Tolerance to understand and describe the brain/bodys reactions in response to stress and adversity. Patients were encouraged to engage in art making to depict what their window of tolerance looked like, to identify what took them out their regulated space, and what coping strategies could be utilized to re-enter their window of tolerance. At the end of group, patients were encouraged to share and process their artwork with the group. Notes:  Mirian Perkins appeared receptive to the information given on Dr. Elan Louis of Tolerance, following along with the handout and group discussion, engaging in art making, and sharing her artwork with the group. Status After Intervention:  Improved    Participation Level:  Active Listener and Interactive    Participation Quality: Appropriate, Attentive and Sharing      Speech:  pressured      Thought Process/Content: Linear      Affective Functioning: Blunted      Mood: anxious      Level of consciousness:  Alert, Oriented x4 and Attentive      Response to Learning: Able to verbalize current knowledge/experience, Able to verbalize/acknowledge new learning, Able to retain information and Capable of insight      Endings: None Reported    Modes of Intervention: Education, Support, Socialization and Exploration      Discipline Responsible: Psychoeducational Specialist      Signature:  Shell Schuster, [address] [facility]

## 2020-03-03 NOTE — PLAN OF CARE
Problem: Altered Mood, Depressive Behavior:  Goal: Ability to disclose and discuss suicidal ideas will improve  Description  Ability to disclose and discuss suicidal ideas will improve  Outcome: Ongoing  Note:   Pt does endorse suicidal ideation today but states she does not have a plan, and feels safe here. Able to contract for safety. Denies HI and AVH. Pt has been isolated to room. Affect is flat. Evasive during one on one interview, refusing to answer some questions and at other times only shrugging her shoulders in response. Pt stated, \"I feel the same. \" Medication compliant.

## 2020-03-04 PROCEDURE — 99233 SBSQ HOSP IP/OBS HIGH 50: CPT | Performed by: PSYCHIATRY & NEUROLOGY

## 2020-03-04 PROCEDURE — 6370000000 HC RX 637 (ALT 250 FOR IP): Performed by: PSYCHIATRY & NEUROLOGY

## 2020-03-04 PROCEDURE — 1240000000 HC EMOTIONAL WELLNESS R&B

## 2020-03-04 RX ORDER — RISPERIDONE 1 MG/1
2 TABLET, ORALLY DISINTEGRATING ORAL 2 TIMES DAILY
Status: DISCONTINUED | OUTPATIENT
Start: 2020-03-04 | End: 2020-03-09 | Stop reason: HOSPADM

## 2020-03-04 RX ADMIN — TRAZODONE HYDROCHLORIDE 50 MG: 50 TABLET ORAL at 20:38

## 2020-03-04 RX ADMIN — DIVALPROEX SODIUM 250 MG: 250 TABLET, DELAYED RELEASE ORAL at 20:37

## 2020-03-04 RX ADMIN — RISPERIDONE 2 MG: 1 TABLET, ORALLY DISINTEGRATING ORAL at 10:49

## 2020-03-04 RX ADMIN — RISPERIDONE 2 MG: 1 TABLET, ORALLY DISINTEGRATING ORAL at 20:38

## 2020-03-04 RX ADMIN — DIVALPROEX SODIUM 250 MG: 250 TABLET, DELAYED RELEASE ORAL at 09:41

## 2020-03-04 RX ADMIN — NICOTINE POLACRILEX 2 MG: 2 GUM, CHEWING BUCCAL at 18:44

## 2020-03-04 RX ADMIN — DIVALPROEX SODIUM 250 MG: 250 TABLET, DELAYED RELEASE ORAL at 14:48

## 2020-03-04 RX ADMIN — FLUOXETINE 20 MG: 20 CAPSULE ORAL at 09:42

## 2020-03-04 RX ADMIN — NICOTINE POLACRILEX 2 MG: 2 GUM, CHEWING BUCCAL at 21:18

## 2020-03-04 RX ADMIN — OLANZAPINE 10 MG: 10 TABLET, FILM COATED ORAL at 09:42

## 2020-03-04 RX ADMIN — SERTRALINE HYDROCHLORIDE 50 MG: 50 TABLET ORAL at 10:48

## 2020-03-04 RX ADMIN — NICOTINE POLACRILEX 2 MG: 2 GUM, CHEWING BUCCAL at 16:04

## 2020-03-04 ASSESSMENT — PAIN SCALES - GENERAL
PAINLEVEL_OUTOF10: 0
PAINLEVEL_OUTOF10: 0

## 2020-03-04 NOTE — PROGRESS NOTES
([facility]    Hepatitis C antibody positive in blood    Tobacco dependence    IV drug abuse ([facility]  Resolved Problems:    * No resolved hospital problems. *       1. Patient s symptoms   show no change  2. Probable discharge is next week  3. Discharge planning is incomplete  4. Suicidal ideation is unchanged   5. Started Zoloft 50mg daily  6. Started Risperdal 2mg PO BID    Total time with patient was 35 minutes and more than 50 % of that time was spent counseling the patient on their symptoms, treatment, and expected goals.

## 2020-03-04 NOTE — GROUP NOTE
Group Therapy Note    Date: 3/4/2020    Group Start Time: 1110  Group End Time: 9241  Group Topic: Psychotherapy    [facility]    Padmaja Rodriguez, [facility]        Group Therapy Note    Attendees: 8      Patient's Goal:  Pt will improve interpersonal interactions through group processing and agenda setting.      Notes:  Pt participated in group by listening. Status After Intervention:  Unchanged    Participation Level:  Active Listener and Minimal    Participation Quality: Appropriate      Speech:  hesitant      Thought Process/Content: Linear      Affective Functioning: Congruent      Mood: depressed      Level of consciousness:  Alert      Response to Learning: Able to verbalize current knowledge/experience      Endings: None Reported    Modes of Intervention: Education, Support, Socialization, Exploration, Clarifying, Problem-solving, Activity, Movement, Confrontation, Limit-setting and Reality-testing      Discipline Responsible: /Counselor      Signature:  Padmaja Rodriguez, [facility]

## 2020-03-04 NOTE — PLAN OF CARE
Problem: Altered Mood, Depressive Behavior:  Goal: Able to verbalize and/or display a decrease in depressive symptoms  Description  Able to verbalize and/or display a decrease in depressive symptoms  Outcome: Ongoing     Problem: Altered Mood, Depressive Behavior:  Goal: Ability to disclose and discuss suicidal ideas will improve  Description  Ability to disclose and discuss suicidal ideas will improve  Outcome: Ongoing     Problem: Altered Mood, Depressive Behavior:  Goal: Absence of self-harm  Description  Absence of self-harm  Outcome: Ongoing   Pt was out on the unit with minimal interaction with peers. Cooperative and verbal in 1:1. Denies SI. Contracts for safety. No harmful behavior voiced or noted. Mostly flat. Taking meds. Had Trazodone 50 mg po at 2047 with good results.

## 2020-03-04 NOTE — BH NOTE
PRN order for nicotine gum order per Dr. Richrd Blizzard. Order placed in [address] Will continue to monitor.

## 2020-03-04 NOTE — GROUP NOTE
Group Therapy Note    Date: 3/4/2020    Group Start Time: 0110  Group End Time: 0200  Group Topic: Cognitive Skills    [facility]    Padmaja Rodriguez, [facility]        Group Therapy Note    Attendees: 9           Patient's Goal:  Pt's goal was to color a picture only using three colors. Pt discuss how to handle when feels choices are limited or feel overwhelmed by too many choices. Notes:  Pt participated in group discussion and activity. Pt met goal.     Status After Intervention:  Unchanged    Participation Level:  Active Listener and Minimal    Participation Quality: Appropriate      Speech:  normal      Thought Process/Content: Linear      Affective Functioning: Congruent      Mood: anxious and depressed      Level of consciousness:  Alert      Response to Learning: Able to verbalize current knowledge/experience and Progressing to goal      Endings: None Reported    Modes of Intervention: Education, Support, Socialization, Exploration, Clarifying, Problem-solving, Activity, Movement, Confrontation, Limit-setting and Reality-testing      Discipline Responsible: /Counselor      Signature:  Padmaja Rodriguez, [facility]

## 2020-03-05 PROCEDURE — 6370000000 HC RX 637 (ALT 250 FOR IP): Performed by: PSYCHIATRY & NEUROLOGY

## 2020-03-05 PROCEDURE — 99233 SBSQ HOSP IP/OBS HIGH 50: CPT | Performed by: PSYCHIATRY & NEUROLOGY

## 2020-03-05 PROCEDURE — 1240000000 HC EMOTIONAL WELLNESS R&B

## 2020-03-05 RX ADMIN — DIVALPROEX SODIUM 250 MG: 250 TABLET, DELAYED RELEASE ORAL at 14:00

## 2020-03-05 RX ADMIN — SERTRALINE HYDROCHLORIDE 50 MG: 50 TABLET ORAL at 08:28

## 2020-03-05 RX ADMIN — DIVALPROEX SODIUM 250 MG: 250 TABLET, DELAYED RELEASE ORAL at 20:26

## 2020-03-05 RX ADMIN — NICOTINE POLACRILEX 2 MG: 2 GUM, CHEWING BUCCAL at 14:01

## 2020-03-05 RX ADMIN — NICOTINE POLACRILEX 2 MG: 2 GUM, CHEWING BUCCAL at 17:17

## 2020-03-05 RX ADMIN — TRAZODONE HYDROCHLORIDE 50 MG: 50 TABLET ORAL at 21:15

## 2020-03-05 RX ADMIN — RISPERIDONE 2 MG: 1 TABLET, ORALLY DISINTEGRATING ORAL at 20:26

## 2020-03-05 RX ADMIN — RISPERIDONE 2 MG: 1 TABLET, ORALLY DISINTEGRATING ORAL at 08:28

## 2020-03-05 RX ADMIN — NICOTINE POLACRILEX 2 MG: 2 GUM, CHEWING BUCCAL at 08:31

## 2020-03-05 RX ADMIN — NICOTINE POLACRILEX 2 MG: 2 GUM, CHEWING BUCCAL at 22:07

## 2020-03-05 RX ADMIN — DIVALPROEX SODIUM 250 MG: 250 TABLET, DELAYED RELEASE ORAL at 08:28

## 2020-03-05 RX ADMIN — NICOTINE POLACRILEX 2 MG: 2 GUM, CHEWING BUCCAL at 11:18

## 2020-03-05 NOTE — PROGRESS NOTES
Patient requested and received nicorette gum 2 mg po for complaint of breakthrough nicotine craving. Patient related that the gum is helpful almost immediately after chewing it. Medication is noted to be effective.

## 2020-03-05 NOTE — PLAN OF CARE
Problem: Altered Mood, Depressive Behavior:  Goal: Able to verbalize acceptance of life and situations over which he or she has no control  Description  Able to verbalize acceptance of life and situations over which he or she has no control  Outcome: Ongoing  Goal: Able to verbalize and/or display a decrease in depressive symptoms  Description  Able to verbalize and/or display a decrease in depressive symptoms  3/4/2020 2248 by Keren Fountain RN  Outcome: Ongoing  Goal: Ability to disclose and discuss suicidal ideas will improve  Description  Ability to disclose and discuss suicidal ideas will improve  Outcome: Ongoing  Goal: Able to verbalize support systems  Description  Able to verbalize support systems  Outcome: Ongoing  Goal: Absence of self-harm  Description  Absence of self-harm  Outcome: Ongoing   Patient denied SI/HI this evening. She is able to contract for safety and will come to the staff if any thoughts of self harm would occur. Patient said that she was feeling better from that of admission. Discharge planning is in progress. Problem: Pain:  Goal: Pain level will decrease  Description  Pain level will decrease  Outcome: Ongoing  Goal: Control of acute pain  Description  Control of acute pain  Outcome: Ongoing  Goal: Control of chronic pain  Description  Control of chronic pain  Outcome: Ongoing   Patient denied any complaint of acute or chronic pain this evening. She is asked to come to the staff if any pain arises during the shift. Patient agrees to do this.

## 2020-03-05 NOTE — PROGRESS NOTES
hydroxide-simethicone     ASSESSMENT AND PLAN:    Active Problems:    Mood disorder (HCC)    Cannabis abuse    Heroin abuse (HCC)    Hepatitis C antibody positive in blood    Tobacco dependence    IV drug abuse ([facility]    Bipolar 1 disorder, mixed, moderate (HCC)  Resolved Problems:    * No resolved hospital problems. *       1. Patient s symptoms are improving  2. Probable discharge is tomorrow  3. Discharge planning is incomplete  4. Suicidal ideation is better    Total time with patient was 35 minutes and more than 50 % of that time was spent counseling the patient on their symptoms, treatment, and expected goals.

## 2020-03-06 LAB
BILIRUBIN URINE: NEGATIVE
BLOOD, URINE: NEGATIVE
CLARITY: CLEAR
COLOR: NORMAL
GLUCOSE URINE: NEGATIVE MG/DL
KETONES, URINE: NEGATIVE MG/DL
LEUKOCYTE ESTERASE, URINE: NEGATIVE
MICROSCOPIC EXAMINATION: NORMAL
NITRITE, URINE: NEGATIVE
PH UA: 6 (ref 5–8)
PROTEIN UA: NEGATIVE MG/DL
SPECIFIC GRAVITY UA: 1.01 (ref 1–1.03)
URINE REFLEX TO CULTURE: NORMAL
URINE TRICHOMONAS EVALUATION: NORMAL
URINE TYPE: NORMAL
UROBILINOGEN, URINE: 0.2 E.U./DL

## 2020-03-06 PROCEDURE — 99233 SBSQ HOSP IP/OBS HIGH 50: CPT | Performed by: PSYCHIATRY & NEUROLOGY

## 2020-03-06 PROCEDURE — 99231 SBSQ HOSP IP/OBS SF/LOW 25: CPT | Performed by: NURSE PRACTITIONER

## 2020-03-06 PROCEDURE — 6370000000 HC RX 637 (ALT 250 FOR IP): Performed by: PSYCHIATRY & NEUROLOGY

## 2020-03-06 PROCEDURE — 81001 URINALYSIS AUTO W/SCOPE: CPT

## 2020-03-06 PROCEDURE — 87491 CHLMYD TRACH DNA AMP PROBE: CPT

## 2020-03-06 PROCEDURE — 87591 N.GONORRHOEAE DNA AMP PROB: CPT

## 2020-03-06 PROCEDURE — 1240000000 HC EMOTIONAL WELLNESS R&B

## 2020-03-06 RX ORDER — FLUCONAZOLE 100 MG/1
200 TABLET ORAL ONCE
Status: COMPLETED | OUTPATIENT
Start: 2020-03-07 | End: 2020-03-07

## 2020-03-06 RX ORDER — DIMETHICONE, OXYBENZONE, AND PADIMATE O 2; 2.5; 6.6 G/100G; G/100G; G/100G
STICK TOPICAL PRN
Status: DISCONTINUED | OUTPATIENT
Start: 2020-03-06 | End: 2020-03-09 | Stop reason: HOSPADM

## 2020-03-06 RX ORDER — SERTRALINE HYDROCHLORIDE 100 MG/1
100 TABLET, FILM COATED ORAL DAILY
Status: DISCONTINUED | OUTPATIENT
Start: 2020-03-07 | End: 2020-03-09 | Stop reason: HOSPADM

## 2020-03-06 RX ADMIN — Medication: at 15:47

## 2020-03-06 RX ADMIN — NICOTINE POLACRILEX 2 MG: 2 GUM, CHEWING BUCCAL at 11:57

## 2020-03-06 RX ADMIN — SERTRALINE HYDROCHLORIDE 50 MG: 50 TABLET ORAL at 08:46

## 2020-03-06 RX ADMIN — NICOTINE POLACRILEX 2 MG: 2 GUM, CHEWING BUCCAL at 15:20

## 2020-03-06 RX ADMIN — DIVALPROEX SODIUM 250 MG: 250 TABLET, DELAYED RELEASE ORAL at 21:15

## 2020-03-06 RX ADMIN — NICOTINE POLACRILEX 2 MG: 2 GUM, CHEWING BUCCAL at 17:31

## 2020-03-06 RX ADMIN — NICOTINE POLACRILEX 2 MG: 2 GUM, CHEWING BUCCAL at 19:44

## 2020-03-06 RX ADMIN — DIVALPROEX SODIUM 250 MG: 250 TABLET, DELAYED RELEASE ORAL at 08:46

## 2020-03-06 RX ADMIN — TRAZODONE HYDROCHLORIDE 50 MG: 50 TABLET ORAL at 21:15

## 2020-03-06 RX ADMIN — RISPERIDONE 2 MG: 1 TABLET, ORALLY DISINTEGRATING ORAL at 08:46

## 2020-03-06 RX ADMIN — DIVALPROEX SODIUM 250 MG: 250 TABLET, DELAYED RELEASE ORAL at 15:18

## 2020-03-06 RX ADMIN — RISPERIDONE 2 MG: 1 TABLET, ORALLY DISINTEGRATING ORAL at 21:15

## 2020-03-06 ASSESSMENT — PAIN SCALES - GENERAL: PAINLEVEL_OUTOF10: 0

## 2020-03-06 NOTE — PROGRESS NOTES
Nutrition Assessment (Low Risk)    Type and Reason for Visit: Initial(LOS x 7)    Nutrition Recommendations:   1. Continue general diet order. 2. Monitor appetite and po intake. Nutrition Assessment:  Patient assessed for nutritional risk. Deemed to be at low risk at this time. Will continue to monitor for changes in status.      Malnutrition Assessment:  · Malnutrition Status: No malnutrition    Nutrition Risk Level   Risk Level: Low    Nutrition Diagnosis:   · Problem: No nutrition diagnosis at this time      Nutrition Intervention:  Food and/or Delivery: Continue current diet  Nutrition Education/Counseling/Coordination of Care:  Continued Inpatient Monitoring, Coordination of Care, Coordination of Community Care      Electronically signed by Nehemias Rodriguez RD, LD on 3/6/20 at 4:53 PM    Contact Number: [phone]

## 2020-03-06 NOTE — PROGRESS NOTES
Progress Note    Admit Date:  2/28/2020    Subjective:  Ms. Walters Hands c/o vaginal discharge (thick yellow) and with a foul odor. Also with some pruritus and lower abdominal pain. Objective:   Vitals:    03/06/20 0902   BP: 118/67   Pulse: 141   Resp: 16   Temp: 98.3 °F (36.8 °C)   SpO2:           No intake or output data in the 24 hours ending 03/06/20 1605    Physical Exam:  Gen: No distress. Alert. Eyes: PERRL. No sclera icterus. No conjunctival injection. ENT: No discharge. Pharynx clear. Neck:  Trachea midline. Resp: No accessory muscle use. No crackles. No wheezes. No rhonchi. CV: Regular rate. Regular rhythm. No murmur. No rub. No edema. GI: Non-tender. Non-distended. Normal bowel sounds. No hernia. Skin: Warm and dry. No rash on exposed extremities. M/S: No cyanosis. No clubbing. Neuro: Awake. Grossly nonfocal, CN II-XII intact    Psych: Defer to psychiatry      Assessment/Plan:  Suicidal Ideation  Depression  - cont mgmt per [facility]HI    Vaginal itching, discharge  - Check for STD's.    - Try diflucan once  - notify provider if symptoms persist.      IVDA   Hepatitis C   - reports using heroin IV  - poor candidate for Hep C therapy as pt is still using IV drugs  - counseled cessation     Cannabis Abuse  - UDS +  - counseled cessation      Heroin Abuse  - last use 5 weeks ago  - counseled cessation     Cocaine Abuse  - last use 5 weeks ago  - counseled cessation     Tobacco Dependence  - Recommended cessation  - nicotine patch ordered     Diet: DIET GENERAL;  Code Status: Full Code    Petra Landaverde [facility]  3/6/2020

## 2020-03-06 NOTE — BH NOTE
[facility] Interdisciplinary Treatment Plan Note     Review Date & Time: 3/6/2020 1755    Patient was not in treatment team.    Admission Type:   Admission Type: Involuntary    Reason for admission:  Reason for Admission: Patient with mood lability, disorganized thoughts, evasive and gaurded, voices AH, racing thoughts, tearful, increased agitation. Thoughts of SI, superficial lac on left forearm    Estimated Length of Stay Update:  2-3 days   Estimated Discharge Date Update: 3/9/2020    PATIENT STRENGTHS:  Patient Strengths:Strengths: No significant Physical Illness, Communication  Patient Strengths and Limitations:Limitations: Inappropriate/potentially harmful leisure interests, General negative or hopeless attitude about future/recovery, Lacks leisure interests, Tendency to isolate self, Apathetic / unmotivated, Hopeless about future, Multiple barriers to leisure interests, Difficulty problem solving/relies on others to help solve problems  Addictive Behavior:Addictive Behavior  In the past 3 months, have you felt or has someone told you that you have a problem with:  : None  Do you have a history of Chemical Use?: No  Do you have a history of Alcohol Use?: Yes  Do you have a history of Street Drug Abuse?: Yes  Histroy of Prescripton Drug Abuse?: Yes  Medical Problems: History reviewed. No pertinent past medical history.     Risk:  Fall RiskTotal: 53  Rory Scale Rory Scale Score: 22  BVC Total: 0    Status EXAM:   Status and Exam  Normal: No  Facial Expression: Flat  Affect: Blunt  Level of Consciousness: Alert  Mood:Normal: No  Mood: Depressed, Anxious  Motor Activity:Normal: Yes  Motor Activity: Decreased  Interview Behavior: Cooperative  Preception: [place] to Person, Breann Rob to Time, [place] to Place, [place] to Situation  Attention:Normal: Yes  Attention: Distractible  Thought Processes: Circumstantial  Thought Content:Normal: Yes  Thought Content: Preoccupations  Hallucinations:

## 2020-03-06 NOTE — PROGRESS NOTES
Department of Psychiatry  Progress Note        Patient's chart was reviewed. Discussed with treatment team. Met with patient. SUBJECTIVE:      Pt was extremely emotional today regarding plans after discharge. Pt reports she is struggling with her past and things that she has done that she regrets and can't forget about. Pt states \"I've wasted my life. I just want to be dead but I don't want to go to double hockey sticks. I took my life for granted. \"  Pt would life to increase dose of Zoloft. Discussed that pt needs to make phone calls today to set up housing after discharge and if pt did not find housing on own then would be homeless shelter or a family member. Pt was given list of housing options, which had been highlighted for her convenience. Pt was encouraged to continue attending groups. ROS:   Patient has new complaints: yes - worsening anxiety and depression compared to yesterday   Sleeping adequately:  Yes   Appetite adequate: Yes  Attending groups: No: due to anxiety     OBJECTIVE:  VITALS:  /67   Pulse 141   Temp 98.3 °F (36.8 °C) (Oral)   Resp 16   Ht 5' 4\" (1.626 m)   Wt 150 lb (68 kg)   LMP 02/01/2020   SpO2 98%   BMI 25.75 kg/m²     Mental Status Examination:  Level of consciousness:  within normal limits  Appearance:  well-appearing, hospital attire, in chair, poor grooming and poor hygiene  Behavior/Motor:  psychomotor agitation  Attitude toward examiner:  cooperative, attentive and good eye contact  Speech:  spontaneous, normal rate and normal volume  Mood:  \"I don't understand myself. I don't feel safe leaving. I wish I wasn't such a piece of shit. \"   Affect:  anxious  Thought processes:  linear, goal directed, coherent and overabundance of ideas  Thought content:  Homocidal ideation denies  Suicidal Ideation:  passive and without plan  Delusions:  no evidence of delusions  Perceptual Disturbance:  denies any perceptual disturbance  Cognition:  Insight:  poor  Judgment:  Poor

## 2020-03-06 NOTE — GROUP NOTE
Group Therapy Note    Date: 3/6/2020    Group Start Time: 1100  Group End Time: 1150  Group Topic: Psychotherapy    [address] [place]    Group Therapy Note    Attendees: 8    Patient shared and set a goal at the beginning of group to practice a new way of being in group today. Notes:  Pt set goal to \"find a safer place to live after here\". Pt was engaged in group and shared her internal battles with her thoughts re: wanting to recover and wanting to return to using. Peer offered pt supportive feedback. Pt left group early and did not return due to group policy. Status After Intervention:  Improved    Participation Level:  Active Listener and Interactive    Participation Quality: Appropriate and Sharing      Speech:  normal      Thought Process/Content: Perseverating      Affective Functioning: Constricted/Restricted      Mood: anxious      Level of consciousness:  Alert and Oriented x4      Response to Learning: Able to verbalize current knowledge/experience, Able to verbalize/acknowledge new learning and Able to retain information      Endings: None Reported    Modes of Intervention: Education, Support, Socialization, Exploration and Clarifying      Discipline Responsible: /Counselor      Signature:  KELLEN Wilson-S, R-JAVIER

## 2020-03-07 PROCEDURE — 86702 HIV-2 ANTIBODY: CPT

## 2020-03-07 PROCEDURE — 1240000000 HC EMOTIONAL WELLNESS R&B

## 2020-03-07 PROCEDURE — 86701 HIV-1ANTIBODY: CPT

## 2020-03-07 PROCEDURE — 80074 ACUTE HEPATITIS PANEL: CPT

## 2020-03-07 PROCEDURE — 6370000000 HC RX 637 (ALT 250 FOR IP): Performed by: PSYCHIATRY & NEUROLOGY

## 2020-03-07 PROCEDURE — 87390 HIV-1 AG IA: CPT

## 2020-03-07 PROCEDURE — 6370000000 HC RX 637 (ALT 250 FOR IP): Performed by: NURSE PRACTITIONER

## 2020-03-07 PROCEDURE — 99233 SBSQ HOSP IP/OBS HIGH 50: CPT | Performed by: PSYCHIATRY & NEUROLOGY

## 2020-03-07 PROCEDURE — 36415 COLL VENOUS BLD VENIPUNCTURE: CPT

## 2020-03-07 RX ORDER — BUSPIRONE HYDROCHLORIDE 5 MG/1
5 TABLET ORAL 3 TIMES DAILY
Status: DISCONTINUED | OUTPATIENT
Start: 2020-03-07 | End: 2020-03-09 | Stop reason: HOSPADM

## 2020-03-07 RX ADMIN — BUSPIRONE HYDROCHLORIDE 5 MG: 5 TABLET ORAL at 20:30

## 2020-03-07 RX ADMIN — NICOTINE POLACRILEX 2 MG: 2 GUM, CHEWING BUCCAL at 18:09

## 2020-03-07 RX ADMIN — RISPERIDONE 2 MG: 1 TABLET, ORALLY DISINTEGRATING ORAL at 20:30

## 2020-03-07 RX ADMIN — NICOTINE POLACRILEX 2 MG: 2 GUM, CHEWING BUCCAL at 12:51

## 2020-03-07 RX ADMIN — BUSPIRONE HYDROCHLORIDE 5 MG: 5 TABLET ORAL at 14:35

## 2020-03-07 RX ADMIN — ALUMINUM HYDROXIDE, MAGNESIUM HYDROXIDE, AND SIMETHICONE 30 ML: 200; 200; 20 SUSPENSION ORAL at 10:07

## 2020-03-07 RX ADMIN — FLUCONAZOLE 200 MG: 100 TABLET ORAL at 08:35

## 2020-03-07 RX ADMIN — NICOTINE POLACRILEX 2 MG: 2 GUM, CHEWING BUCCAL at 10:49

## 2020-03-07 RX ADMIN — NICOTINE POLACRILEX 2 MG: 2 GUM, CHEWING BUCCAL at 15:39

## 2020-03-07 RX ADMIN — RISPERIDONE 2 MG: 1 TABLET, ORALLY DISINTEGRATING ORAL at 08:36

## 2020-03-07 RX ADMIN — DIVALPROEX SODIUM 250 MG: 250 TABLET, DELAYED RELEASE ORAL at 08:35

## 2020-03-07 RX ADMIN — DIVALPROEX SODIUM 250 MG: 250 TABLET, DELAYED RELEASE ORAL at 20:30

## 2020-03-07 RX ADMIN — DIVALPROEX SODIUM 250 MG: 250 TABLET, DELAYED RELEASE ORAL at 14:35

## 2020-03-07 RX ADMIN — TRAZODONE HYDROCHLORIDE 50 MG: 50 TABLET ORAL at 20:32

## 2020-03-07 RX ADMIN — SERTRALINE 100 MG: 100 TABLET, FILM COATED ORAL at 08:35

## 2020-03-07 RX ADMIN — ALUMINUM HYDROXIDE, MAGNESIUM HYDROXIDE, AND SIMETHICONE 30 ML: 200; 200; 20 SUSPENSION ORAL at 15:39

## 2020-03-07 NOTE — PLAN OF CARE
Problem: Altered Mood, Depressive Behavior:  Goal: Able to verbalize acceptance of life and situations over which he or she has no control  Description  Able to verbalize acceptance of life and situations over which he or she has no control  Outcome: Ongoing  Goal: Able to verbalize and/or display a decrease in depressive symptoms  Description  Able to verbalize and/or display a decrease in depressive symptoms  Outcome: Ongoing  Goal: Ability to disclose and discuss suicidal ideas will improve  Description  Ability to disclose and discuss suicidal ideas will improve  3/6/2020 2210 by Billie Castellanos RN  Outcome: Ongoing  Goal: Able to verbalize support systems  Description  Able to verbalize support systems  Outcome: Ongoing  Goal: Absence of self-harm  Description  Absence of self-harm  3/6/2020 2210 by Billie Castellanos RN   Patient denied SI/HI this evening. She said that she would come to the staff if thoughts of self destruction would occur. Problem: Pain:  Goal: Pain level will decrease  Description  Pain level will decrease  Outcome: Ongoing  Goal: Control of acute pain  Description  Control of acute pain  Outcome: Ongoing  Goal: Control of chronic pain  Description  Control of chronic pain  Outcome: Ongoing   Patient denied any complaint of pain this evening. She is asked to come to the staff if any discomfort occurs. Patient is in agreement to do this.

## 2020-03-07 NOTE — PLAN OF CARE
Problem: Altered Mood, Depressive Behavior:  Goal: Able to verbalize and/or display a decrease in depressive symptoms  Description: Able to verbalize and/or display a decrease in depressive symptoms  3/7/2020 1018 by Chalo Nesbitt  Outcome: Ongoing  Patient visible on unit, social with peers. Attended and participated in group. Medication compliant. Denies SI/HI/AV/HV. Patient states her over all mood is improving and stated, \"I am having a very good day. \"  Patient feels her medications are working properly. Problem: Pain:  Goal: Pain level will decrease  Description: Pain level will decrease  3/7/2020 1018 by Chalo Nesbitt  Outcome: Ongoing    Patient states that she is not experiencing any pain today, but did complain of an upset stomach and some indigestion. PRN medications given.

## 2020-03-07 NOTE — PROGRESS NOTES
Phosphatase 02/28/2020 53  40 - 129 U/L Final    ALT 02/28/2020 22  10 - 40 U/L Final    AST 02/28/2020 23  15 - 37 U/L Final    Globulin 02/28/2020 3.3  g/dL Final    Ethanol Lvl 02/28/2020 None Detected  mg/dL Final    Comment:    None Detected  Conversion factor:  100 mg/dl = .100 g/dl  For Medical Purposes Only      Acetaminophen Level 02/28/2020 <5* 10 - 30 ug/mL Final    Comment: Therapeutic Range: 10.0-30.0 ug/mL  Toxic: >=901 ug/mL      Salicylate, Serum 25/90/1810 0.4* 15.0 - 30.0 mg/dL Final    Comment: Therapeutic Range: 15.0-30.0 mg/dL  Toxic: >30.0 mg/dL      WBC 02/28/2020 6.9  4.0 - 11.0 K/uL Final    RBC 02/28/2020 5.06  4.00 - 5.20 M/uL Final    Hemoglobin 02/28/2020 15.4  12.0 - 16.0 g/dL Final    Hematocrit 02/28/2020 45.1  36.0 - 48.0 % Final    MCV 02/28/2020 89.2  80.0 - 100.0 fL Final    MCH 02/28/2020 30.4  26.0 - 34.0 pg Final    MCHC 02/28/2020 34.1  31.0 - 36.0 g/dL Final    RDW 02/28/2020 13.4  12.4 - 15.4 % Final    Platelets 35/45/9007 255  135 - 450 K/uL Final    MPV 02/28/2020 8.6  5.0 - 10.5 fL Final    Neutrophils % 02/28/2020 61.7  % Final    Lymphocytes % 02/28/2020 29.3  % Final    Monocytes % 02/28/2020 7.7  % Final    Eosinophils % 02/28/2020 0.9  % Final    Basophils % 02/28/2020 0.4  % Final    Neutrophils Absolute 02/28/2020 4.2  1.7 - 7.7 K/uL Final    Lymphocytes Absolute 02/28/2020 2.0  1.0 - 5.1 K/uL Final    Monocytes Absolute 02/28/2020 0.5  0.0 - 1.3 K/uL Final    Eosinophils Absolute 02/28/2020 0.1  0.0 - 0.6 K/uL Final    Basophils Absolute 02/28/2020 0.0  0.0 - 0.2 K/uL Final    hCG Qual 02/28/2020 Negative  Detects HCG level >10 MIU/mL Final    Cholesterol, Total 02/29/2020 217* 0 - 199 mg/dL Final    Triglycerides 02/29/2020 155* 0 - 150 mg/dL Final    HDL 02/29/2020 44  40 - 60 mg/dL Final    LDL Calculated 02/29/2020 142* <100 mg/dL Final    VLDL Cholesterol Calculated 02/29/2020 31  Not Established mg/dL Final    Hemoglobin A1C 02/29/2020 4.8  See comment % Final    Comment: Comment:  Diagnosis of Diabetes: > or = 6.5%  Increased risk of diabetes (Prediabetes): 5.7-6.4%  Glycemic Control: Nonpregnant Adults: <7.0%                    Pregnant: <6.0%        eAG 02/29/2020 91.1  mg/dL Final    Ventricular Rate 02/29/2020 69  BPM Final    Atrial Rate 02/29/2020 69  BPM Final    P-R Interval 02/29/2020 112  ms Final    QRS Duration 02/29/2020 84  ms Final    Q-T Interval 02/29/2020 410  ms Final    QTc Calculation (Bazett) 02/29/2020 439  ms Final    P Axis 02/29/2020 44  degrees Final    R Axis 02/29/2020 59  degrees Final    T Axis 02/29/2020 31  degrees Final    Diagnosis 02/29/2020 Normal sinus rhythmNormal ECGNo previous ECGs availableConfirmed by Sarah Presley MD, Rylee Cain ([id number]) on 2/29/2020 4:03:12 PM   Final    Urine Trichomonas Evaluation 03/06/2020 None Seen   Final    Color, UA 03/06/2020 Straw  Straw/Yellow Final    Clarity, UA 03/06/2020 Clear  Clear Final    Glucose, Ur 03/06/2020 Negative  Negative mg/dL Final    Bilirubin Urine 03/06/2020 Negative  Negative Final    Ketones, Urine 03/06/2020 Negative  Negative mg/dL Final    Specific Julian, UA 03/06/2020 1.010  1.005 - 1.030 Final    Blood, Urine 03/06/2020 Negative  Negative Final    pH, UA 03/06/2020 6.0  5.0 - 8.0 Final    Protein, UA 03/06/2020 Negative  Negative mg/dL Final    Urobilinogen, Urine 03/06/2020 0.2  <2.0 E.U./dL Final    Nitrite, Urine 03/06/2020 Negative  Negative Final    Leukocyte Esterase, Urine 03/06/2020 Negative  Negative Final    Microscopic Examination 03/06/2020 Not Indicated   Final    Urine Type 03/06/2020 NotGiven   Final    Urine received in a container without preservatives.     Urine Reflex to Culture 03/06/2020 Not Indicated   Final            Medications  Current Facility-Administered Medications: busPIRone (BUSPAR) tablet 5 mg, 5 mg, Oral, TID  sertraline (ZOLOFT) tablet 100 mg, 100 mg, Oral, Daily  medicated lip balm

## 2020-03-07 NOTE — PROGRESS NOTES
1:1 Assessment 10 minutes. Patient is alert and oriented x 4. Uses direct eye contact and is dressed appropriately in street clothing. Patient denied SI/HI,A/V hallucinations. The patient stated her mood was:\"It's been good today\". Patient did attend groups this shift and participated appropriately. The patient does understand why she is here. Patient is hoping to be discharged from the hospital soon. Patient is medication compliant. Judgement,insight and impulse control are limited. Patient denied any physical complaints this evening. Vital signs are stable. Safety checks continue Q 15 minutes throughout the shift. PRNS this shift? Trazodone 50 mg po = somewhat effective. Patient obtained 6 hours of sleep this shift.

## 2020-03-08 LAB
HAV IGM SER IA-ACNC: ABNORMAL
HEPATITIS B CORE IGM ANTIBODY: ABNORMAL
HEPATITIS B SURFACE ANTIGEN INTERPRETATION: ABNORMAL
HEPATITIS C ANTIBODY INTERPRETATION: REACTIVE

## 2020-03-08 PROCEDURE — 1240000000 HC EMOTIONAL WELLNESS R&B

## 2020-03-08 PROCEDURE — 6370000000 HC RX 637 (ALT 250 FOR IP): Performed by: PSYCHIATRY & NEUROLOGY

## 2020-03-08 RX ADMIN — BUSPIRONE HYDROCHLORIDE 5 MG: 5 TABLET ORAL at 13:35

## 2020-03-08 RX ADMIN — SERTRALINE 100 MG: 100 TABLET, FILM COATED ORAL at 08:19

## 2020-03-08 RX ADMIN — DIVALPROEX SODIUM 250 MG: 250 TABLET, DELAYED RELEASE ORAL at 13:35

## 2020-03-08 RX ADMIN — HYDROXYZINE PAMOATE 50 MG: 50 CAPSULE ORAL at 12:01

## 2020-03-08 RX ADMIN — NICOTINE POLACRILEX 2 MG: 2 GUM, CHEWING BUCCAL at 20:43

## 2020-03-08 RX ADMIN — RISPERIDONE 2 MG: 1 TABLET, ORALLY DISINTEGRATING ORAL at 08:18

## 2020-03-08 RX ADMIN — NICOTINE POLACRILEX 2 MG: 2 GUM, CHEWING BUCCAL at 08:19

## 2020-03-08 RX ADMIN — NICOTINE POLACRILEX 2 MG: 2 GUM, CHEWING BUCCAL at 18:46

## 2020-03-08 RX ADMIN — RISPERIDONE 2 MG: 1 TABLET, ORALLY DISINTEGRATING ORAL at 20:36

## 2020-03-08 RX ADMIN — BUSPIRONE HYDROCHLORIDE 5 MG: 5 TABLET ORAL at 08:18

## 2020-03-08 RX ADMIN — DIVALPROEX SODIUM 250 MG: 250 TABLET, DELAYED RELEASE ORAL at 08:18

## 2020-03-08 RX ADMIN — TRAZODONE HYDROCHLORIDE 50 MG: 50 TABLET ORAL at 20:44

## 2020-03-08 RX ADMIN — DIVALPROEX SODIUM 250 MG: 250 TABLET, DELAYED RELEASE ORAL at 20:36

## 2020-03-08 RX ADMIN — BUSPIRONE HYDROCHLORIDE 5 MG: 5 TABLET ORAL at 20:36

## 2020-03-08 RX ADMIN — OLANZAPINE 5 MG: 5 TABLET, FILM COATED ORAL at 13:32

## 2020-03-08 NOTE — PLAN OF CARE
Problem: Altered Mood, Depressive Behavior:  Goal: Able to verbalize and/or display a decrease in depressive symptoms  Description: Able to verbalize and/or display a decrease in depressive symptoms  3/8/2020 0925 by Sarah Valentin RN  Outcome: Ongoing  Client is bright and pleasant. Talking with peers in the milieu. Reports feeling positive. Denies SI/HI. Client reports, \" I still need to find somewhere to live when I leave here. \" medication compliant. Reports sleeping well. No concerns noted.

## 2020-03-08 NOTE — GROUP NOTE
Group Therapy Note    Date: 3/8/2020    Group Start Time: 1:30 pm  Group End Time: 2:30 pm  Group Topic: Cognitive Skills    [address] [facility]        Group Therapy Note    Attendees: 8       Patient's Goal:  Pt will write a positive affirmation about each group member of group. Pt will share their affirmations, aloud, and say which affirmation was their favorite and why. Pt also received, 100 Positive Affirmations handout, and will highlight and share affirmations that resinate with them. Notes:  Pt was appropriate and shared in group. Status After Intervention:  Improved    Participation Level:  Active Listener and Interactive    Participation Quality: Appropriate and Attentive      Speech:  normal      Thought Process/Content: Logical      Affective Functioning: Congruent      Mood: euthymic      Level of consciousness:  Alert and Oriented x4      Response to Learning: Able to verbalize current knowledge/experience, Able to verbalize/acknowledge new learning and Progressing to goal      Endings: None Reported    Modes of Intervention: Support, Socialization and Activity      Discipline Responsible: /Counselor      Signature:  PATITO Lanier

## 2020-03-09 VITALS
BODY MASS INDEX: 25.61 KG/M2 | OXYGEN SATURATION: 97 % | SYSTOLIC BLOOD PRESSURE: 119 MMHG | HEIGHT: 64 IN | TEMPERATURE: 98.2 F | DIASTOLIC BLOOD PRESSURE: 63 MMHG | RESPIRATION RATE: 16 BRPM | WEIGHT: 150 LBS | HEART RATE: 76 BPM

## 2020-03-09 LAB
C. TRACHOMATIS DNA ,URINE: NEGATIVE
HIV AG/AB: NORMAL
HIV ANTIGEN: NORMAL
HIV-1 ANTIBODY: NORMAL
HIV-2 AB: NORMAL
N. GONORRHOEAE DNA, URINE: NEGATIVE

## 2020-03-09 PROCEDURE — 99239 HOSP IP/OBS DSCHRG MGMT >30: CPT | Performed by: PSYCHIATRY & NEUROLOGY

## 2020-03-09 PROCEDURE — 6370000000 HC RX 637 (ALT 250 FOR IP): Performed by: PSYCHIATRY & NEUROLOGY

## 2020-03-09 PROCEDURE — 5130000000 HC BRIDGE APPOINTMENT

## 2020-03-09 RX ORDER — BUSPIRONE HYDROCHLORIDE 10 MG/1
10 TABLET ORAL 3 TIMES DAILY
Qty: 90 TABLET | Refills: 0 | Status: ON HOLD | OUTPATIENT
Start: 2020-03-09 | End: 2020-04-07 | Stop reason: HOSPADM

## 2020-03-09 RX ORDER — DIVALPROEX SODIUM 250 MG/1
250 TABLET, DELAYED RELEASE ORAL 3 TIMES DAILY
Qty: 90 TABLET | Refills: 0 | Status: ON HOLD | OUTPATIENT
Start: 2020-03-09 | End: 2020-04-07 | Stop reason: HOSPADM

## 2020-03-09 RX ORDER — RISPERIDONE 2 MG/1
2 TABLET, ORALLY DISINTEGRATING ORAL 2 TIMES DAILY
Qty: 60 TABLET | Refills: 0 | Status: ON HOLD | OUTPATIENT
Start: 2020-03-09 | End: 2020-04-07 | Stop reason: HOSPADM

## 2020-03-09 RX ADMIN — DIVALPROEX SODIUM 250 MG: 250 TABLET, DELAYED RELEASE ORAL at 08:29

## 2020-03-09 RX ADMIN — BUSPIRONE HYDROCHLORIDE 5 MG: 5 TABLET ORAL at 13:45

## 2020-03-09 RX ADMIN — NICOTINE POLACRILEX 2 MG: 2 GUM, CHEWING BUCCAL at 10:02

## 2020-03-09 RX ADMIN — HYDROXYZINE PAMOATE 50 MG: 50 CAPSULE ORAL at 12:57

## 2020-03-09 RX ADMIN — RISPERIDONE 2 MG: 1 TABLET, ORALLY DISINTEGRATING ORAL at 08:30

## 2020-03-09 RX ADMIN — DIVALPROEX SODIUM 250 MG: 250 TABLET, DELAYED RELEASE ORAL at 13:45

## 2020-03-09 RX ADMIN — SERTRALINE 100 MG: 100 TABLET, FILM COATED ORAL at 08:30

## 2020-03-09 RX ADMIN — BUSPIRONE HYDROCHLORIDE 5 MG: 5 TABLET ORAL at 08:30

## 2020-03-09 NOTE — PLAN OF CARE
Problem: Altered Mood, Depressive Behavior:  Goal: Absence of self-harm  Description: Absence of self-harm  Outcome: Ongoing   Vidal Lockwood has been out in the milieu with peers and attended group this shift. Patient denies current SI/HI, denies A/V/H and reports her mood is \"great:. Patient medication compliant and absent of self harm. PRN Trazodone given as requested for sleep. Medication effective. Will monitor.

## 2020-03-09 NOTE — BH NOTE
[address] [facility]  Discharge Note    Pt discharged with followings belongings:   Dentures: None  Vision - Corrective Lenses: None  Hearing Aid: None  Jewelry: None  Body Piercings Removed: No  Clothing: Pants, Shirt, Socks  Were All Patient Medications Collected?: Not Applicable  Other Valuables: None   Valuables sent home with patient. Valuables retrieved from safe, Security envelope number:  none and returned to patient. Patient education on aftercare instructions: yes  Information faxed to [address] [place] [address],[address] by charge nurse Patient verbalize understanding of AVS:  yes. Status EXAM upon discharge:  Status and Exam  Normal: Yes  Facial Expression: Brightened  Affect: Appropriate  Level of Consciousness: Alert  Mood:Normal: Yes  Mood: Depressed, Anxious  Motor Activity:Normal: No  Motor Activity: Decreased  Interview Behavior: Cooperative  Preception: [place] to Person, [address] to Time, [place] to Place, [place] to Situation  Attention:Normal: Yes  Attention: Distractible  Thought Processes: Circumstantial  Thought Content:Normal: Yes  Thought Content: Preoccupations  Hallucinations: None  Delusions: No  Memory:Normal: Yes  Memory: Poor Recent  Insight and Judgment: No (improving)  Insight and Judgment: Poor Judgment, Poor Insight (improving)  Present Suicidal Ideation: No  Present Homicidal Ideation: No      Metabolic Screening:    Lab Results   Component Value Date    LABA1C 4.8 02/29/2020       Lab Results   Component Value Date    CHOL 217 (H) 02/29/2020     Lab Results   Component Value Date    TRIG 155 (H) 02/29/2020     Lab Results   Component Value Date    HDL 44 02/29/2020     No components found for: [facility] [place]  Lab Results   Component Value Date    LABVLDL 31 02/29/2020     Bridge Appointment completed: Reviewed Discharge Instructions with patient. Patient verbalizes understanding and agreement with the discharge plan using the teachback method.      Referral for Outpatient Tobacco Cessation Counseling, upon discharge (shruti X if applicable and completed):    ( )  Hospital staff assisted patient to call Quit Line or faxed referral                                   during hospitalization                  ( )  Recognizing danger situations (included triggers and roadblocks), if not completed on admission                    ( )  Coping skills (new ways to manage stress, exercise, relaxation techniques, changing routine, distraction), if not completed on admission                                                           ( x)  Basic information about quitting (benefits of quitting, techniques in how to quit, available resources, if not completed on admission  ( ) Referral for counseling faxed to [facility]   ( ) Patient refused referral  (x ) Patient refused counseling  ( ) Patient refused smoking cessation medication upon discharge    Vaccinations (shruti X if applicable and completed):  ( ) Patient states already received influenza vaccine elsewhere  ( ) Patient received influenza vaccine during this hospitalization  (x ) Patient refused influenza vaccine at this time    Federico Rogers RN

## 2020-03-09 NOTE — BH NOTE
Group Therapy Note    Date: 3/8/2020  Start Time: 20:00  End Time:  21:00  Number of Participants: 10    Type of Group: Recreational  Wrap up    Wellness Binder Information  Module Name:  /  Session Number:  /    Patient's Goal:  Stay positive    Notes:  Better coping skills    Status After Intervention:  Unchanged    Participation Level:  Active Listener and Interactive    Participation Quality: Appropriate and Attentive      Speech:  pressured      Thought Process/Content: Logical      Affective Functioning: Blunted      Mood: stable      Level of consciousness:  Alert and Attentive      Response to Learning: Able to change behavior      Endings: None Reported    Modes of Intervention: Socialization and Problem-solving      Discipline Responsible: Behavorial Health Tech      Signature:  Johnnie Sykse

## 2020-03-09 NOTE — GROUP NOTE
Group Therapy Note    Date: 3/9/2020    Group Start Time: 1000  Group End Time: 1050  [facility]        [facility]    Attendees: 9         Patient's Goal:  Patient will complete worksheet on boundaries and will discuss how they apply to mental wellbeing. Notes:  Patient attended group. Completed the worksheet and discussed in group. Verbalized a basic understanding of boundaries and talked about problems she has had in the past with poor boundaries. Status After Intervention:  Improved    Participation Level:  Active Listener and Interactive    Participation Quality: Appropriate and Attentive    Speech:  normal    Thought Process/Content: Logical    Affective Functioning: Congruent    Mood: anxious, depressed     Level of consciousness:  Oriented x4    Response to Learning: Able to verbalize current knowledge/experience and Able to verbalize/acknowledge new learning    Endings: None Reported    Modes of Intervention: Education, Support, Socialization and Exploration    Discipline Responsible: /Counselor    Signature:  Taina Hull [facility]

## 2020-03-31 ENCOUNTER — HOSPITAL ENCOUNTER (INPATIENT)
Age: 30
LOS: 7 days | Discharge: NURSING, REHAB OR LONG-TERM CARE | DRG: 753 | End: 2020-04-07
Attending: EMERGENCY MEDICINE | Admitting: PSYCHIATRY & NEUROLOGY
Payer: COMMERCIAL

## 2020-03-31 PROBLEM — F31.9 BIPOLAR 1 DISORDER (HCC): Status: ACTIVE | Noted: 2020-03-31

## 2020-03-31 LAB
A/G RATIO: 1.3 (ref 1.1–2.2)
ALBUMIN SERPL-MCNC: 4.4 G/DL (ref 3.4–5)
ALP BLD-CCNC: 54 U/L (ref 40–129)
ALT SERPL-CCNC: 18 U/L (ref 10–40)
AMPHETAMINE SCREEN, URINE: ABNORMAL
ANION GAP SERPL CALCULATED.3IONS-SCNC: 15 MMOL/L (ref 3–16)
AST SERPL-CCNC: 16 U/L (ref 15–37)
BARBITURATE SCREEN URINE: ABNORMAL
BASOPHILS ABSOLUTE: 0.1 K/UL (ref 0–0.2)
BASOPHILS RELATIVE PERCENT: 1 %
BENZODIAZEPINE SCREEN, URINE: ABNORMAL
BILIRUB SERPL-MCNC: 0.3 MG/DL (ref 0–1)
BUN BLDV-MCNC: 10 MG/DL (ref 7–20)
CALCIUM SERPL-MCNC: 9.5 MG/DL (ref 8.3–10.6)
CANNABINOID SCREEN URINE: POSITIVE
CHLORIDE BLD-SCNC: 100 MMOL/L (ref 99–110)
CO2: 20 MMOL/L (ref 21–32)
COCAINE METABOLITE SCREEN URINE: ABNORMAL
CREAT SERPL-MCNC: 0.6 MG/DL (ref 0.6–1.1)
EOSINOPHILS ABSOLUTE: 0.1 K/UL (ref 0–0.6)
EOSINOPHILS RELATIVE PERCENT: 1.7 %
ETHANOL: NORMAL MG/DL (ref 0–0.08)
GFR AFRICAN AMERICAN: >60
GFR NON-AFRICAN AMERICAN: >60
GLOBULIN: 3.4 G/DL
GLUCOSE BLD-MCNC: 82 MG/DL (ref 70–99)
HCG(URINE) PREGNANCY TEST: NEGATIVE
HCT VFR BLD CALC: 44.7 % (ref 36–48)
HEMOGLOBIN: 15.1 G/DL (ref 12–16)
LYMPHOCYTES ABSOLUTE: 2.1 K/UL (ref 1–5.1)
LYMPHOCYTES RELATIVE PERCENT: 33.1 %
Lab: ABNORMAL
MCH RBC QN AUTO: 30.9 PG (ref 26–34)
MCHC RBC AUTO-ENTMCNC: 33.8 G/DL (ref 31–36)
MCV RBC AUTO: 91.3 FL (ref 80–100)
METHADONE SCREEN, URINE: ABNORMAL
MONOCYTES ABSOLUTE: 0.7 K/UL (ref 0–1.3)
MONOCYTES RELATIVE PERCENT: 11.5 %
NEUTROPHILS ABSOLUTE: 3.4 K/UL (ref 1.7–7.7)
NEUTROPHILS RELATIVE PERCENT: 52.7 %
OPIATE SCREEN URINE: ABNORMAL
OXYCODONE URINE: ABNORMAL
PDW BLD-RTO: 13.9 % (ref 12.4–15.4)
PH UA: 6
PHENCYCLIDINE SCREEN URINE: ABNORMAL
PLATELET # BLD: 221 K/UL (ref 135–450)
PMV BLD AUTO: 8.8 FL (ref 5–10.5)
POTASSIUM SERPL-SCNC: 4.5 MMOL/L (ref 3.5–5.1)
PROPOXYPHENE SCREEN: ABNORMAL
RBC # BLD: 4.89 M/UL (ref 4–5.2)
SODIUM BLD-SCNC: 135 MMOL/L (ref 136–145)
TOTAL PROTEIN: 7.8 G/DL (ref 6.4–8.2)
TSH SERPL DL<=0.05 MIU/L-ACNC: 2.72 UIU/ML (ref 0.27–4.2)
VALPROIC ACID LEVEL: <2.8 UG/ML (ref 50–100)
WBC # BLD: 6.5 K/UL (ref 4–11)

## 2020-03-31 PROCEDURE — 80053 COMPREHEN METABOLIC PANEL: CPT

## 2020-03-31 PROCEDURE — 6370000000 HC RX 637 (ALT 250 FOR IP): Performed by: PSYCHIATRY & NEUROLOGY

## 2020-03-31 PROCEDURE — 80307 DRUG TEST PRSMV CHEM ANLYZR: CPT

## 2020-03-31 PROCEDURE — G0480 DRUG TEST DEF 1-7 CLASSES: HCPCS

## 2020-03-31 PROCEDURE — 83036 HEMOGLOBIN GLYCOSYLATED A1C: CPT

## 2020-03-31 PROCEDURE — 1240000000 HC EMOTIONAL WELLNESS R&B

## 2020-03-31 PROCEDURE — 84443 ASSAY THYROID STIM HORMONE: CPT

## 2020-03-31 PROCEDURE — 36415 COLL VENOUS BLD VENIPUNCTURE: CPT

## 2020-03-31 PROCEDURE — 84703 CHORIONIC GONADOTROPIN ASSAY: CPT

## 2020-03-31 PROCEDURE — 99285 EMERGENCY DEPT VISIT HI MDM: CPT

## 2020-03-31 PROCEDURE — 85025 COMPLETE CBC W/AUTO DIFF WBC: CPT

## 2020-03-31 PROCEDURE — 6370000000 HC RX 637 (ALT 250 FOR IP): Performed by: NURSE PRACTITIONER

## 2020-03-31 PROCEDURE — 80164 ASSAY DIPROPYLACETIC ACD TOT: CPT

## 2020-03-31 RX ORDER — OLANZAPINE 10 MG/1
10 TABLET ORAL
Status: ACTIVE | OUTPATIENT
Start: 2020-03-31 | End: 2020-03-31

## 2020-03-31 RX ORDER — HYDROXYZINE PAMOATE 50 MG/1
50 CAPSULE ORAL 3 TIMES DAILY PRN
Status: DISCONTINUED | OUTPATIENT
Start: 2020-03-31 | End: 2020-04-07 | Stop reason: HOSPADM

## 2020-03-31 RX ORDER — IBUPROFEN 400 MG/1
400 TABLET ORAL EVERY 6 HOURS PRN
Status: DISCONTINUED | OUTPATIENT
Start: 2020-03-31 | End: 2020-04-07 | Stop reason: HOSPADM

## 2020-03-31 RX ORDER — NICOTINE 21 MG/24HR
1 PATCH, TRANSDERMAL 24 HOURS TRANSDERMAL DAILY
Status: DISCONTINUED | OUTPATIENT
Start: 2020-03-31 | End: 2020-04-07 | Stop reason: HOSPADM

## 2020-03-31 RX ORDER — BENZTROPINE MESYLATE 1 MG/ML
2 INJECTION INTRAMUSCULAR; INTRAVENOUS 2 TIMES DAILY PRN
Status: DISCONTINUED | OUTPATIENT
Start: 2020-03-31 | End: 2020-04-07 | Stop reason: HOSPADM

## 2020-03-31 RX ORDER — TRAZODONE HYDROCHLORIDE 50 MG/1
50 TABLET ORAL NIGHTLY PRN
Status: DISCONTINUED | OUTPATIENT
Start: 2020-03-31 | End: 2020-04-07 | Stop reason: HOSPADM

## 2020-03-31 RX ORDER — OLANZAPINE 10 MG/1
10 INJECTION, POWDER, LYOPHILIZED, FOR SOLUTION INTRAMUSCULAR
Status: ACTIVE | OUTPATIENT
Start: 2020-03-31 | End: 2020-03-31

## 2020-03-31 RX ORDER — ACETAMINOPHEN 325 MG/1
650 TABLET ORAL EVERY 4 HOURS PRN
Status: DISCONTINUED | OUTPATIENT
Start: 2020-03-31 | End: 2020-04-07 | Stop reason: HOSPADM

## 2020-03-31 RX ORDER — MAGNESIUM HYDROXIDE/ALUMINUM HYDROXICE/SIMETHICONE 120; 1200; 1200 MG/30ML; MG/30ML; MG/30ML
30 SUSPENSION ORAL EVERY 6 HOURS PRN
Status: DISCONTINUED | OUTPATIENT
Start: 2020-03-31 | End: 2020-04-07 | Stop reason: HOSPADM

## 2020-03-31 RX ADMIN — TRAZODONE HYDROCHLORIDE 50 MG: 50 TABLET ORAL at 20:43

## 2020-03-31 ASSESSMENT — SLEEP AND FATIGUE QUESTIONNAIRES
DIFFICULTY STAYING ASLEEP: NO
DIFFICULTY ARISING: NO
SLEEP PATTERN: NORMAL
RESTFUL SLEEP: YES
DO YOU USE A SLEEP AID: NO
DIFFICULTY FALLING ASLEEP: NO
DO YOU HAVE DIFFICULTY SLEEPING: NO

## 2020-03-31 ASSESSMENT — LIFESTYLE VARIABLES: HISTORY_ALCOHOL_USE: YES

## 2020-03-31 ASSESSMENT — PAIN SCALES - GENERAL: PAINLEVEL_OUTOF10: 0

## 2020-03-31 NOTE — ED NOTES
Blood obtained per Jonelle Slade RN using ultrasound machine. Blood sent to lab.       Deacon Rose RN  03/31/20 9615
Lab in with pt attempting to draw blood.      [facility], [address]   03/31/20 2367
Patient has order to be admitted to [facility]. Report called to [employer]. Security called to take patient to [facility].       Erlinda Auguste RN  03/31/20 0799
loss   []  Chronic pain or medical illness   []  Social isolation   []  History of violence   []  Active psychosis   []  Cognitive impairment    [x]  No outpatient services in place   [x]  Medication noncompliance   [x]  No collateral information to support safety   []      PROTECTIVE FACTORS:  [x] Age >25 and <55  [x] Female gender   [] Denies depression  [] Denies suicidal ideation  [] Does not have lethal plan   [x] Does not have access to guns or weapons  [] Patient is verbally vijay for safety  [] No prior suicide attempts  [] No active substance abuse  [] Patient has social or family support  [x] No active psychosis or cognitive dysfunction  [x] Physically healthy  [] Has outpatient services in place  [] Compliant with recommended medications  [] Collateral information from  supports patient safety   [] Patient is future oriented with plans to   [x] Adequate sleep and appetite       Clinical Summary:    Patient presents to [facility]  voluntarily accompanied by police. Patient upon arrival appears depressed and tearful, flat affect. Patient alert and oriented. Patient reports she is suicidal with a plan to hang himself. Reports she was at work today at [employer] and asked them to call police. Patient reports she has didn't want to act because she didn't want to go to hell. Reports she is depressed. Reports she lives in a car with her ex-boyfriend. Reports she has no support. Reports she is not future oriented. Reports she was here at beginning of March and should not have left. Reports she feels she needs to stay. Patient was clinically sober at the time of the evaluation. Patient was evaluated and offered supportive counseling. Will continue to monitor patient. Once medically cleared will present to Psychiatric provider.               Pennie Schmidt RN  03/31/20 4885

## 2020-04-01 LAB
BACTERIA: ABNORMAL /HPF
BILIRUBIN URINE: NEGATIVE
BLOOD, URINE: NEGATIVE
CHOLESTEROL, TOTAL: 202 MG/DL (ref 0–199)
CLARITY: CLEAR
COLOR: YELLOW
ESTIMATED AVERAGE GLUCOSE: 88.2 MG/DL
GLUCOSE URINE: NEGATIVE MG/DL
HBA1C MFR BLD: 4.7 %
HDLC SERPL-MCNC: 64 MG/DL (ref 40–60)
KETONES, URINE: NEGATIVE MG/DL
LDL CHOLESTEROL CALCULATED: 122 MG/DL
LEUKOCYTE ESTERASE, URINE: ABNORMAL
MICROSCOPIC EXAMINATION: YES
NITRITE, URINE: NEGATIVE
PH UA: 7 (ref 5–8)
PROTEIN UA: NEGATIVE MG/DL
RBC UA: ABNORMAL /HPF (ref 0–4)
SPECIFIC GRAVITY UA: 1.02 (ref 1–1.03)
TRICHOMONAS: ABNORMAL /HPF
TRIGL SERPL-MCNC: 80 MG/DL (ref 0–150)
URINE REFLEX TO CULTURE: YES
URINE TRICHOMONAS EVALUATION: NORMAL
URINE TYPE: ABNORMAL
UROBILINOGEN, URINE: 0.2 E.U./DL
VLDLC SERPL CALC-MCNC: 16 MG/DL
WBC UA: ABNORMAL /HPF (ref 0–5)

## 2020-04-01 PROCEDURE — 6370000000 HC RX 637 (ALT 250 FOR IP): Performed by: NURSE PRACTITIONER

## 2020-04-01 PROCEDURE — 87491 CHLMYD TRACH DNA AMP PROBE: CPT

## 2020-04-01 PROCEDURE — 80061 LIPID PANEL: CPT

## 2020-04-01 PROCEDURE — 6370000000 HC RX 637 (ALT 250 FOR IP): Performed by: PSYCHIATRY & NEUROLOGY

## 2020-04-01 PROCEDURE — 87591 N.GONORRHOEAE DNA AMP PROB: CPT

## 2020-04-01 PROCEDURE — 1240000000 HC EMOTIONAL WELLNESS R&B

## 2020-04-01 PROCEDURE — 99222 1ST HOSP IP/OBS MODERATE 55: CPT | Performed by: PHYSICIAN ASSISTANT

## 2020-04-01 PROCEDURE — 81001 URINALYSIS AUTO W/SCOPE: CPT

## 2020-04-01 PROCEDURE — 99223 1ST HOSP IP/OBS HIGH 75: CPT | Performed by: PSYCHIATRY & NEUROLOGY

## 2020-04-01 PROCEDURE — 87086 URINE CULTURE/COLONY COUNT: CPT

## 2020-04-01 RX ORDER — ACAMPROSATE CALCIUM 333 MG/1
666 TABLET, DELAYED RELEASE ORAL 3 TIMES DAILY
Status: DISCONTINUED | OUTPATIENT
Start: 2020-04-01 | End: 2020-04-07 | Stop reason: HOSPADM

## 2020-04-01 RX ADMIN — NICOTINE POLACRILEX 2 MG: 2 GUM, CHEWING BUCCAL at 19:45

## 2020-04-01 RX ADMIN — ACAMPROSATE CALCIUM 666 MG: 333 TABLET, DELAYED RELEASE ORAL at 20:32

## 2020-04-01 RX ADMIN — OLANZAPINE 15 MG: 10 TABLET, FILM COATED ORAL at 20:32

## 2020-04-01 RX ADMIN — SERTRALINE HYDROCHLORIDE 50 MG: 50 TABLET ORAL at 13:14

## 2020-04-01 RX ADMIN — ACETAMINOPHEN 650 MG: 325 TABLET ORAL at 09:50

## 2020-04-01 RX ADMIN — ACAMPROSATE CALCIUM 666 MG: 333 TABLET, DELAYED RELEASE ORAL at 14:44

## 2020-04-01 ASSESSMENT — PAIN SCALES - GENERAL: PAINLEVEL_OUTOF10: 5

## 2020-04-01 NOTE — H&P
 Platelets 83/53/4534 221  135 - 450 K/uL Final    MPV 03/31/2020 8.8  5.0 - 10.5 fL Final    Neutrophils % 03/31/2020 52.7  % Final    Lymphocytes % 03/31/2020 33.1  % Final    Monocytes % 03/31/2020 11.5  % Final    Eosinophils % 03/31/2020 1.7  % Final    Basophils % 03/31/2020 1.0  % Final    Neutrophils Absolute 03/31/2020 3.4  1.7 - 7.7 K/uL Final    Lymphocytes Absolute 03/31/2020 2.1  1.0 - 5.1 K/uL Final    Monocytes Absolute 03/31/2020 0.7  0.0 - 1.3 K/uL Final    Eosinophils Absolute 03/31/2020 0.1  0.0 - 0.6 K/uL Final    Basophils Absolute 03/31/2020 0.1  0.0 - 0.2 K/uL Final    Sodium 03/31/2020 135* 136 - 145 mmol/L Final    Potassium 03/31/2020 4.5  3.5 - 5.1 mmol/L Final    Chloride 03/31/2020 100  99 - 110 mmol/L Final    CO2 03/31/2020 20* 21 - 32 mmol/L Final    Anion Gap 03/31/2020 15  3 - 16 Final    Glucose 03/31/2020 82  70 - 99 mg/dL Final    BUN 03/31/2020 10  7 - 20 mg/dL Final    CREATININE 03/31/2020 0.6  0.6 - 1.1 mg/dL Final    GFR Non- 03/31/2020 >60  >60 Final    Comment: >60 mL/min/1.73m2 EGFR, calc. for ages 25 and older using the  MDRD formula (not corrected for weight), is valid for stable  renal function.  GFR  03/31/2020 >60  >60 Final    Comment: Chronic Kidney Disease: less than 60 ml/min/1.73 sq.m. Kidney Failure: less than 15 ml/min/1.73 sq.m. Results valid for patients 18 years and older.       Calcium 03/31/2020 9.5  8.3 - 10.6 mg/dL Final    Total Protein 03/31/2020 7.8  6.4 - 8.2 g/dL Final    Alb 03/31/2020 4.4  3.4 - 5.0 g/dL Final    Albumin/Globulin Ratio 03/31/2020 1.3  1.1 - 2.2 Final    Total Bilirubin 03/31/2020 0.3  0.0 - 1.0 mg/dL Final    Alkaline Phosphatase 03/31/2020 54  40 - 129 U/L Final    ALT 03/31/2020 18  10 - 40 U/L Final    AST 03/31/2020 16  15 - 37 U/L Final    Globulin 03/31/2020 3.4  g/dL Final    Ethanol Lvl 03/31/2020 None Detected  mg/dL Final    Comment:    None

## 2020-04-01 NOTE — H&P
Hospital Medicine History & Physical      PCP: No primary care provider on file. Date of Admission: 3/31/2020    Date of Service: Pt seen/examined on 4/1/2020    Chief Complaint:    Chief Complaint   Patient presents with    Suicidal     plans to hang herself      History Of Present Illness: The patient is a 34 y.o. female with hepatitis C, Bipolar DO who presented to [facility] with complaint of SI. Patient was seen and evaluated in the ED by the ED medical provider, patient was medically cleared for admission to [facility] at [facility]. This note serves as an admission medical H&P.    PCP: No primary care provider on file. Tobacco use: denies  ETOH use: yes, reports every day use if she could, had her last drink prior to arrival to ED, no prior hx of ETOH withdrawal   Illicit drug use: +MJ    Patient reports vaginal discharge, had unprotected sex 1 week ago, denies bleeding, pain, abdominal pain, N/V, fevers. Discussed options for evaluation with pelvic exam vs self swab for G/C. Patient agreed to plans for self swab. Past Medical History:        Diagnosis Date    Hepatitis C 03/07/2020       Past Surgical History:        Procedure Laterality Date    MOUTH SURGERY         Medications Prior to Admission:    Prior to Admission medications    Medication Sig Start Date End Date Taking? Authorizing Provider   divalproex (DEPAKOTE) 250 MG DR tablet Take 1 tablet by mouth 3 times daily 3/9/20   Yessica Salmeron MD   busPIRone (BUSPAR) 10 MG tablet Take 1 tablet by mouth 3 times daily 3/9/20   Yessica Salmeron MD   risperiDONE (RISPERDAL M-TABS) 2 MG disintegrating tablet Take 1 tablet by mouth 2 times daily 3/9/20   Yessica Salmeron MD       Allergies:  Patient has no known allergies. Social History:  The patient currently lives at home    TOBACCO:   reports that she quit smoking about 7 months ago. Her smoking use included cigarettes. She smoked 1.00 pack per day.  She has never used smokeless this admission for review    RADIOLOGY  No orders to display       Pertinent previous results reviewed   None     ASSESSMENT/PLAN:  Bipolar DO  - per psychiatry team    Marijuana Use  - recommend cessation   - +UDS     Hepatitis C   - reactive hepatitis panel on 3/7/20   - has not been treated     ETOH Abuse  - reports that she has been drinking more alcohol lately   - No evidence of withdrawals    -Monitor     Vaginal Discharge   - reports it has been like this before as well   - Had negative G/C and trichomonas testing in early March   - Reports unprotected sex about 1 week ago and that she has had discharge since her last admission   - No pain, fevers, abdominal pain, N/V  - discussion options for pelvic exam vs self swab, patient prefers self swab for G/C testing   - Will check UA and urine trich as well      Socorro Mon PA-C  4/1/2020 11:56 AM

## 2020-04-02 LAB — URINE CULTURE, ROUTINE: NORMAL

## 2020-04-02 PROCEDURE — 97530 THERAPEUTIC ACTIVITIES: CPT

## 2020-04-02 PROCEDURE — 97166 OT EVAL MOD COMPLEX 45 MIN: CPT

## 2020-04-02 PROCEDURE — 6370000000 HC RX 637 (ALT 250 FOR IP): Performed by: PSYCHIATRY & NEUROLOGY

## 2020-04-02 PROCEDURE — 99233 SBSQ HOSP IP/OBS HIGH 50: CPT | Performed by: PSYCHIATRY & NEUROLOGY

## 2020-04-02 PROCEDURE — 6370000000 HC RX 637 (ALT 250 FOR IP): Performed by: NURSE PRACTITIONER

## 2020-04-02 PROCEDURE — 1240000000 HC EMOTIONAL WELLNESS R&B

## 2020-04-02 PROCEDURE — 97535 SELF CARE MNGMENT TRAINING: CPT

## 2020-04-02 RX ORDER — SERTRALINE HYDROCHLORIDE 100 MG/1
100 TABLET, FILM COATED ORAL DAILY
Status: DISCONTINUED | OUTPATIENT
Start: 2020-04-03 | End: 2020-04-07 | Stop reason: HOSPADM

## 2020-04-02 RX ADMIN — ACAMPROSATE CALCIUM 666 MG: 333 TABLET, DELAYED RELEASE ORAL at 14:12

## 2020-04-02 RX ADMIN — ACAMPROSATE CALCIUM 666 MG: 333 TABLET, DELAYED RELEASE ORAL at 09:58

## 2020-04-02 RX ADMIN — TRAZODONE HYDROCHLORIDE 50 MG: 50 TABLET ORAL at 20:29

## 2020-04-02 RX ADMIN — NICOTINE POLACRILEX 2 MG: 2 GUM, CHEWING BUCCAL at 17:58

## 2020-04-02 RX ADMIN — ACAMPROSATE CALCIUM 666 MG: 333 TABLET, DELAYED RELEASE ORAL at 20:29

## 2020-04-02 RX ADMIN — NICOTINE POLACRILEX 2 MG: 2 GUM, CHEWING BUCCAL at 15:04

## 2020-04-02 RX ADMIN — OLANZAPINE 15 MG: 10 TABLET, FILM COATED ORAL at 20:28

## 2020-04-02 RX ADMIN — SERTRALINE HYDROCHLORIDE 50 MG: 50 TABLET ORAL at 09:58

## 2020-04-02 NOTE — PROGRESS NOTES
03/31/2020 33.8  31.0 - 36.0 g/dL Final    RDW 03/31/2020 13.9  12.4 - 15.4 % Final    Platelets 74/43/3755 221  135 - 450 K/uL Final    MPV 03/31/2020 8.8  5.0 - 10.5 fL Final    Neutrophils % 03/31/2020 52.7  % Final    Lymphocytes % 03/31/2020 33.1  % Final    Monocytes % 03/31/2020 11.5  % Final    Eosinophils % 03/31/2020 1.7  % Final    Basophils % 03/31/2020 1.0  % Final    Neutrophils Absolute 03/31/2020 3.4  1.7 - 7.7 K/uL Final    Lymphocytes Absolute 03/31/2020 2.1  1.0 - 5.1 K/uL Final    Monocytes Absolute 03/31/2020 0.7  0.0 - 1.3 K/uL Final    Eosinophils Absolute 03/31/2020 0.1  0.0 - 0.6 K/uL Final    Basophils Absolute 03/31/2020 0.1  0.0 - 0.2 K/uL Final    Sodium 03/31/2020 135* 136 - 145 mmol/L Final    Potassium 03/31/2020 4.5  3.5 - 5.1 mmol/L Final    Chloride 03/31/2020 100  99 - 110 mmol/L Final    CO2 03/31/2020 20* 21 - 32 mmol/L Final    Anion Gap 03/31/2020 15  3 - 16 Final    Glucose 03/31/2020 82  70 - 99 mg/dL Final    BUN 03/31/2020 10  7 - 20 mg/dL Final    CREATININE 03/31/2020 0.6  0.6 - 1.1 mg/dL Final    GFR Non- 03/31/2020 >60  >60 Final    Comment: >60 mL/min/1.73m2 EGFR, calc. for ages 25 and older using the  MDRD formula (not corrected for weight), is valid for stable  renal function.  GFR  03/31/2020 >60  >60 Final    Comment: Chronic Kidney Disease: less than 60 ml/min/1.73 sq.m. Kidney Failure: less than 15 ml/min/1.73 sq.m. Results valid for patients 18 years and older.       Calcium 03/31/2020 9.5  8.3 - 10.6 mg/dL Final    Total Protein 03/31/2020 7.8  6.4 - 8.2 g/dL Final    Alb 03/31/2020 4.4  3.4 - 5.0 g/dL Final    Albumin/Globulin Ratio 03/31/2020 1.3  1.1 - 2.2 Final    Total Bilirubin 03/31/2020 0.3  0.0 - 1.0 mg/dL Final    Alkaline Phosphatase 03/31/2020 54  40 - 129 U/L Final    ALT 03/31/2020 18  10 - 40 U/L Final    AST 03/31/2020 16  15 - 37 U/L Final    Globulin 03/31/2020 3.4 g/dL Final    Ethanol Lvl 03/31/2020 None Detected  mg/dL Final    Comment:    None Detected  Conversion factor:  100 mg/dl = .100 g/dl  For Medical Purposes Only      Amphetamine Screen, Urine 03/31/2020 Neg  Negative <1000ng/mL Final    Barbiturate Screen, Ur 03/31/2020 Neg  Negative <200 ng/mL Final    Benzodiazepine Screen, Urine 03/31/2020 Neg  Negative <200 ng/mL Final    Cannabinoid Scrn, Ur 03/31/2020 POSITIVE* Negative <50 ng/mL Final    Cocaine Metabolite Screen, Urine 03/31/2020 Neg  Negative <300 ng/mL Final    Opiate Scrn, Ur 03/31/2020 Neg  Negative <300 ng/mL Final    Comment: \"Therapeutic levels of pain medication, especially oxycontin and synthetic  opioids, may not be detected by this Methodology. Pain management screen  panel  Drug panel-PM-Hi Res Ur, Interp (PAIN) should be considered for drug  monitoring \".  PCP Screen, Urine 03/31/2020 Neg  Negative <25 ng/mL Final    Methadone Screen, Urine 03/31/2020 Neg  Negative <300 ng/mL Final    Propoxyphene Scrn, Ur 03/31/2020 Neg  Negative <300 ng/mL Final    Oxycodone Urine 03/31/2020 Neg  Negative <100 ng/ml Final    pH, UA 03/31/2020 6.0   Final    Comment: Urine pH less than 5.0 or greater than 8.0 may indicate sample adulteration. Another sample should be collected if clinically  indicated.  Drug Screen Comment: 03/31/2020 see below   Final    Comment: This method is a screening test to detect only these drug  classes as part of a medical workup. Confirmatory testing  by another method should be ordered if clinically indicated.       HCG(Urine) Pregnancy Test 03/31/2020 Negative  Detects HCG level >20 MIU/mL Final    Comment: Note:  Always repeat results in question with a serum  quantitative pregnancy test. A serum hCG is positive  2-5 days before the urine hCG test.      Valproic Acid Lvl 03/31/2020 <2.8* 50.0 - 100.0 ug/mL Final    TSH 03/31/2020 2.72  0.27 - 4.20 uIU/mL Final    Hemoglobin A1C 03/31/2020 4.7  See comment % Final    Comment: Comment:  Diagnosis of Diabetes: > or = 6.5%  Increased risk of diabetes (Prediabetes): 5.7-6.4%  Glycemic Control: Nonpregnant Adults: <7.0%                    Pregnant: <6.0%        eAG 03/31/2020 88.2  mg/dL Final    Cholesterol, Total 04/01/2020 202* 0 - 199 mg/dL Final    Triglycerides 04/01/2020 80  0 - 150 mg/dL Final    HDL 04/01/2020 64* 40 - 60 mg/dL Final    LDL Calculated 04/01/2020 122* <100 mg/dL Final    VLDL Cholesterol Calculated 04/01/2020 16  Not Established mg/dL Final    Urine Trichomonas Evaluation 04/01/2020 None Seen   Final    Color, UA 04/01/2020 Yellow  Straw/Yellow Final    Clarity, UA 04/01/2020 Clear  Clear Final    Glucose, Ur 04/01/2020 Negative  Negative mg/dL Final    Bilirubin Urine 04/01/2020 Negative  Negative Final    Ketones, Urine 04/01/2020 Negative  Negative mg/dL Final    Specific [place], UA 04/01/2020 1.020  1.005 - 1.030 Final    Blood, Urine 04/01/2020 Negative  Negative Final    pH, UA 04/01/2020 7.0  5.0 - 8.0 Final    Protein, UA 04/01/2020 Negative  Negative mg/dL Final    Urobilinogen, Urine 04/01/2020 0.2  <2.0 E.U./dL Final    Nitrite, Urine 04/01/2020 Negative  Negative Final    Leukocyte Esterase, Urine 04/01/2020 MODERATE* Negative Final    Microscopic Examination 04/01/2020 YES   Final    Urine Type 04/01/2020 NotGiven   Final    Urine received in a container without preservatives.     Urine Reflex to Culture 04/01/2020 Yes   Final    Urine Culture, Routine 04/01/2020 No growth at 18-36 hours   Final    WBC, UA 04/01/2020 21-50* 0 - 5 /HPF Final    RBC, UA 04/01/2020 0-2  0 - 4 /HPF Final    Bacteria, UA 04/01/2020 2+* None Seen /HPF Final    Trichomonas, UA 04/01/2020 None Seen  None Seen /HPF Final            Medications  Current Facility-Administered Medications: [START ON 4/3/2020] sertraline (ZOLOFT) tablet 100 mg, 100 mg, Oral, Daily  OLANZapine (ZYPREXA) tablet 15 mg, 15 mg, Oral,

## 2020-04-02 NOTE — PROGRESS NOTES
Inpatient Occupational Therapy  Evaluation and Treatment    Unit:  [facility]  Date:  4/2/2020  Patient Name:    Tamiko Brar  Admitting diagnosis:  Bipolar 1 disorder [facility]) [F31.9]  Admit Date:  3/31/2020  Precautions/Restrictions/WB Status/ Lines/ Wounds/ Oxygen:  General [facility] precautions, SI with plan  Per Chart Review:  Pt suicidal with plan to hang self, off medications and doesn't feel safe at home. 35 y/o wf well known to our service and recently discharge that came to ED for worsening depression and SI with specific plan to hang self. Per pt she has been off her meds and she has been abusing her buspar. Pt stated that she also has been drinking a bottle of alcohol every other day and she has been using drugs when she can. She reports she is suicidal with a plan to hang himself. Reports she was at work today at [employer] and asked them to call police. Patient reports she has didn't want to act because she didn't want to go to hell.  Reports she is depressed. Reports she lives in a car with her ex-boyfriend. Reports she has no support. Reports she is not future oriented. Reports she was here at beginning of March and should not have left. She stated that her depression is 10/10, anhedonia, dec sleep, no motivation, no energy, si with plan, hopeless and helpless at this time. She denies any current naomy or psychosis. Alok Calhoun MD, 4/1/20)    Treatment Number:  1    Treatment Time: 14:30-15:05  Timed Code Treatment Minutes:   25  minutes   Total Treatment Time:    35  minutes    Staff Recommendations:    IND with ambulation    Patient Goals for Therapy:  \" To go to a program. \"    Discharge Recommendations:   Home with weekly assist, rehab program if willing    DME needs for discharge:  None     AM-PAC Score: 24     Home Health S4 Level: [x] NA    ACLS:Mode 5.2  Engaging Abilities and Following Safety Precautions When the Person Can Learn to Improve the Fine Details of Actions     DESCRIPTION:   18% patient/caregiver ed/instruction.        Signature and License Number  Chetan Butts Gen 87, OTR/L  #[id number]           If patient discharges from this facility prior to next visit, this note will serve as the Discharge Summary

## 2020-04-03 LAB
C. TRACHOMATIS DNA ,URINE: NEGATIVE
N. GONORRHOEAE DNA, URINE: NEGATIVE

## 2020-04-03 PROCEDURE — 6370000000 HC RX 637 (ALT 250 FOR IP): Performed by: PSYCHIATRY & NEUROLOGY

## 2020-04-03 PROCEDURE — 97530 THERAPEUTIC ACTIVITIES: CPT

## 2020-04-03 PROCEDURE — 1240000000 HC EMOTIONAL WELLNESS R&B

## 2020-04-03 PROCEDURE — 99233 SBSQ HOSP IP/OBS HIGH 50: CPT | Performed by: PSYCHIATRY & NEUROLOGY

## 2020-04-03 PROCEDURE — 6370000000 HC RX 637 (ALT 250 FOR IP): Performed by: NURSE PRACTITIONER

## 2020-04-03 RX ORDER — CHLORPROMAZINE HYDROCHLORIDE 25 MG/1
25 TABLET, FILM COATED ORAL 3 TIMES DAILY PRN
Status: DISCONTINUED | OUTPATIENT
Start: 2020-04-03 | End: 2020-04-07 | Stop reason: HOSPADM

## 2020-04-03 RX ADMIN — CHLORPROMAZINE HYDROCHLORIDE 25 MG: 25 TABLET, SUGAR COATED ORAL at 11:10

## 2020-04-03 RX ADMIN — ACAMPROSATE CALCIUM 666 MG: 333 TABLET, DELAYED RELEASE ORAL at 09:46

## 2020-04-03 RX ADMIN — NICOTINE POLACRILEX 2 MG: 2 GUM, CHEWING BUCCAL at 17:35

## 2020-04-03 RX ADMIN — TRAZODONE HYDROCHLORIDE 50 MG: 50 TABLET ORAL at 20:51

## 2020-04-03 RX ADMIN — SERTRALINE 100 MG: 100 TABLET, FILM COATED ORAL at 08:49

## 2020-04-03 RX ADMIN — ACAMPROSATE CALCIUM 666 MG: 333 TABLET, DELAYED RELEASE ORAL at 20:49

## 2020-04-03 RX ADMIN — NICOTINE POLACRILEX 2 MG: 2 GUM, CHEWING BUCCAL at 15:42

## 2020-04-03 RX ADMIN — OLANZAPINE 15 MG: 10 TABLET, FILM COATED ORAL at 20:49

## 2020-04-03 RX ADMIN — ACAMPROSATE CALCIUM 666 MG: 333 TABLET, DELAYED RELEASE ORAL at 14:55

## 2020-04-03 ASSESSMENT — PAIN SCALES - GENERAL: PAINLEVEL_OUTOF10: 0

## 2020-04-03 NOTE — PLAN OF CARE
Pt has been visible on unit. Watched TV. Attended group. Calmer. Brighter. Denied SI/HI. Denied hallucinations, paranoia. Stated needed something for sleep, that she slept all day. When asked why she responded \"depression\". She did say that she is better. Spontaneous, pleasant. Gave prn Trazadone for sleep. Med was effective.

## 2020-04-03 NOTE — GROUP NOTE
Group Therapy Note    Date: 4/2/2020    Group Start Time: 2040  Group End Time: 2050  Group Topic: Wrap-Up    [facility]        Group Therapy Note    Attendees: Goals and importance of goal setting discussed. Night time milieu activities discussed.          Patient's Goal:  Stay focused on now    Notes:  Successful     Status After Intervention:  Improved    Participation Level: Interactive    Participation Quality: Appropriate and Sharing      Speech:  normal      Thought Process/Content: Logical      Affective Functioning: Congruent      Mood: good      Level of consciousness:  Alert and Oriented x4      Response to Learning: Progressing to goal      Endings: None Reported    Modes of Intervention: Support      Discipline Responsible: [employer]      Signature:  Fiona Luciano

## 2020-04-03 NOTE — PROGRESS NOTES
- 34.0 pg Final    MCHC 03/31/2020 33.8  31.0 - 36.0 g/dL Final    RDW 03/31/2020 13.9  12.4 - 15.4 % Final    Platelets 35/72/7656 221  135 - 450 K/uL Final    MPV 03/31/2020 8.8  5.0 - 10.5 fL Final    Neutrophils % 03/31/2020 52.7  % Final    Lymphocytes % 03/31/2020 33.1  % Final    Monocytes % 03/31/2020 11.5  % Final    Eosinophils % 03/31/2020 1.7  % Final    Basophils % 03/31/2020 1.0  % Final    Neutrophils Absolute 03/31/2020 3.4  1.7 - 7.7 K/uL Final    Lymphocytes Absolute 03/31/2020 2.1  1.0 - 5.1 K/uL Final    Monocytes Absolute 03/31/2020 0.7  0.0 - 1.3 K/uL Final    Eosinophils Absolute 03/31/2020 0.1  0.0 - 0.6 K/uL Final    Basophils Absolute 03/31/2020 0.1  0.0 - 0.2 K/uL Final    Sodium 03/31/2020 135* 136 - 145 mmol/L Final    Potassium 03/31/2020 4.5  3.5 - 5.1 mmol/L Final    Chloride 03/31/2020 100  99 - 110 mmol/L Final    CO2 03/31/2020 20* 21 - 32 mmol/L Final    Anion Gap 03/31/2020 15  3 - 16 Final    Glucose 03/31/2020 82  70 - 99 mg/dL Final    BUN 03/31/2020 10  7 - 20 mg/dL Final    CREATININE 03/31/2020 0.6  0.6 - 1.1 mg/dL Final    GFR Non- 03/31/2020 >60  >60 Final    Comment: >60 mL/min/1.73m2 EGFR, calc. for ages 25 and older using the  MDRD formula (not corrected for weight), is valid for stable  renal function.  GFR  03/31/2020 >60  >60 Final    Comment: Chronic Kidney Disease: less than 60 ml/min/1.73 sq.m. Kidney Failure: less than 15 ml/min/1.73 sq.m. Results valid for patients 18 years and older.       Calcium 03/31/2020 9.5  8.3 - 10.6 mg/dL Final    Total Protein 03/31/2020 7.8  6.4 - 8.2 g/dL Final    Alb 03/31/2020 4.4  3.4 - 5.0 g/dL Final    Albumin/Globulin Ratio 03/31/2020 1.3  1.1 - 2.2 Final    Total Bilirubin 03/31/2020 0.3  0.0 - 1.0 mg/dL Final    Alkaline Phosphatase 03/31/2020 54  40 - 129 U/L Final    ALT 03/31/2020 18  10 - 40 U/L Final    AST 03/31/2020 16  15 - 37 U/L Final    Globulin 03/31/2020 3.4  g/dL Final    Ethanol Lvl 03/31/2020 None Detected  mg/dL Final    Comment:    None Detected  Conversion factor:  100 mg/dl = .100 g/dl  For Medical Purposes Only      Amphetamine Screen, Urine 03/31/2020 Neg  Negative <1000ng/mL Final    Barbiturate Screen, Ur 03/31/2020 Neg  Negative <200 ng/mL Final    Benzodiazepine Screen, Urine 03/31/2020 Neg  Negative <200 ng/mL Final    Cannabinoid Scrn, Ur 03/31/2020 POSITIVE* Negative <50 ng/mL Final    Cocaine Metabolite Screen, Urine 03/31/2020 Neg  Negative <300 ng/mL Final    Opiate Scrn, Ur 03/31/2020 Neg  Negative <300 ng/mL Final    Comment: \"Therapeutic levels of pain medication, especially oxycontin and synthetic  opioids, may not be detected by this Methodology. Pain management screen  panel  Drug panel-PM-Hi Res Ur, Interp (PAIN) should be considered for drug  monitoring \".  PCP Screen, Urine 03/31/2020 Neg  Negative <25 ng/mL Final    Methadone Screen, Urine 03/31/2020 Neg  Negative <300 ng/mL Final    Propoxyphene Scrn, Ur 03/31/2020 Neg  Negative <300 ng/mL Final    Oxycodone Urine 03/31/2020 Neg  Negative <100 ng/ml Final    pH, UA 03/31/2020 6.0   Final    Comment: Urine pH less than 5.0 or greater than 8.0 may indicate sample adulteration. Another sample should be collected if clinically  indicated.  Drug Screen Comment: 03/31/2020 see below   Final    Comment: This method is a screening test to detect only these drug  classes as part of a medical workup. Confirmatory testing  by another method should be ordered if clinically indicated.       HCG(Urine) Pregnancy Test 03/31/2020 Negative  Detects HCG level >20 MIU/mL Final    Comment: Note:  Always repeat results in question with a serum  quantitative pregnancy test. A serum hCG is positive  2-5 days before the urine hCG test.      Valproic Acid Lvl 03/31/2020 <2.8* 50.0 - 100.0 ug/mL Final    TSH 03/31/2020 2.72  0.27 - 4.20 uIU/mL Final    Hemoglobin A1C 03/31/2020 4.7  See comment % Final    Comment: Comment:  Diagnosis of Diabetes: > or = 6.5%  Increased risk of diabetes (Prediabetes): 5.7-6.4%  Glycemic Control: Nonpregnant Adults: <7.0%                    Pregnant: <6.0%        eAG 03/31/2020 88.2  mg/dL Final    Cholesterol, Total 04/01/2020 202* 0 - 199 mg/dL Final    Triglycerides 04/01/2020 80  0 - 150 mg/dL Final    HDL 04/01/2020 64* 40 - 60 mg/dL Final    LDL Calculated 04/01/2020 122* <100 mg/dL Final    VLDL Cholesterol Calculated 04/01/2020 16  Not Established mg/dL Final    Urine Trichomonas Evaluation 04/01/2020 None Seen   Final    Color, UA 04/01/2020 Yellow  Straw/Yellow Final    Clarity, UA 04/01/2020 Clear  Clear Final    Glucose, Ur 04/01/2020 Negative  Negative mg/dL Final    Bilirubin Urine 04/01/2020 Negative  Negative Final    Ketones, Urine 04/01/2020 Negative  Negative mg/dL Final    Specific [place], UA 04/01/2020 1.020  1.005 - 1.030 Final    Blood, Urine 04/01/2020 Negative  Negative Final    pH, UA 04/01/2020 7.0  5.0 - 8.0 Final    Protein, UA 04/01/2020 Negative  Negative mg/dL Final    Urobilinogen, Urine 04/01/2020 0.2  <2.0 E.U./dL Final    Nitrite, Urine 04/01/2020 Negative  Negative Final    Leukocyte Esterase, Urine 04/01/2020 MODERATE* Negative Final    Microscopic Examination 04/01/2020 YES   Final    Urine Type 04/01/2020 NotGiven   Final    Urine received in a container without preservatives.     Urine Reflex to Culture 04/01/2020 Yes   Final    Urine Culture, Routine 04/01/2020 No growth at 18-36 hours   Final    WBC, UA 04/01/2020 21-50* 0 - 5 /HPF Final    RBC, UA 04/01/2020 0-2  0 - 4 /HPF Final    Bacteria, UA 04/01/2020 2+* None Seen /HPF Final    Trichomonas, UA 04/01/2020 None Seen  None Seen /HPF Final            Medications  Current Facility-Administered Medications: chlorproMAZINE (THORAZINE) tablet 25 mg, 25 mg, Oral, TID PRN  sertraline (ZOLOFT) tablet 100 mg, 100 mg, Oral,

## 2020-04-03 NOTE — PROGRESS NOTES
Occupational Therapy Daily Treatment Note    Unit: [facility]  Date:  4/3/2020  Patient Name:    Soila Noonan  Admitting diagnosis:  Bipolar 1 disorder [facility]) [F31.9]  Admit Date:  3/31/2020  Precautions/Restrictions:  General [facility] precautions       Discharge Recommendations: Home with PRN assist   DME needs for discharge: Needs Met       Therapy recommendations for staff: Independent ambulation     AM-PAC Score: 24  Home Health S4 Level: NA       Treatment Time:  1628-9699  Treatment number:  2    Total Treatment Time:   60 minutes      Subjective:  Pt agreeable to work with therapy this date. Pt reports she is hopeful to get into an inpt drug rehab facility. Pt requests to meet in [facility] this date. Pain   None reported throughout     Coping Skills  Pt participated in conversation about coping skills and strategies. Pt defined coping skills as the following: \"way to deal with emotions and feelings as a toll, control actions\"   Pt was able to independently identify the following previously used coping skills:   + Art, juan miguel   -  Smoke cigarettes, use drugs, over shopping     Following conversation, pt agreed to journal about attempted coping strategies and discuss at next session. Pt reports he/she would like to trial the following coping strategies: journal, meditation, aromatherapy     Sleep Hygiene: Pt was asked the following questions. She gave her perceived answer and then we discussed the worksheet. 1. What is a good sleep environment? Pt response: dark, night light, oils (smells), nature sounds   2. What is a good night sleep? Pt response: rest easy, 8 hours   3. What will stop you from having a good night sleep? Pt response: naps, medications   4. What should you do to fall asleep? Pt response: take a shower, read a book, warm milk   5. What will impede the transition to sleep?    Pt response: No response     Goal Setting:   Pt participated in conversation based on SMART goal setting model. Pt reports goal to get into a rehab program.  STG: To get a hold of my mom --> ask for help (gather my things)   LTG: don't have to ask my mom for anything (in depth discussion about the steps and goals required to get to this point)     Patient Education:   Role of OT   Coping skills  Sleep hygiene  Goal setting     Positioning Needs:   Pt seated in community room playing cards upon conclusion of the session. Family Present:  No    Assessment: Pt with good progress this date. Pt with good participation and able to identify when she needed a mental break. Pt may benefit from continued skilled occupational therapy while in the hospital in order to progress to safe and more indpt functioning. GOALS  To be met in 3 Visits:  1). Pt will verbalize 3 coping skills  (Goal addressed, not met, 4/3/2020)   2). Pt. To verbalize understanding of sleep hygiene education/handouts. (Goal met and d/c; 4/3/2020)      To be met in 5 Visits:  1). Pt. To complete 1 SMART long term goal and 2 SMART short term goals. (Goal addressed, not met, 4/3/2020)   2). Pt. To identify 3 memory strategies to take medications as prescribed.    3). Pt. To complete interest check list.   4). Pt. To complete wellness plan. 5).  Pt to demonstrate ability to schedule healthy morning routine with Min A    Plan: cont with POC      Rodena Smoke, MOT, OTR/L   [id number]       If patient discharges from this facility prior to next visit, this note will serve as the Discharge Summary

## 2020-04-04 PROCEDURE — 6370000000 HC RX 637 (ALT 250 FOR IP): Performed by: PSYCHIATRY & NEUROLOGY

## 2020-04-04 PROCEDURE — 99233 SBSQ HOSP IP/OBS HIGH 50: CPT | Performed by: PSYCHIATRY & NEUROLOGY

## 2020-04-04 PROCEDURE — 1240000000 HC EMOTIONAL WELLNESS R&B

## 2020-04-04 RX ADMIN — ACAMPROSATE CALCIUM 666 MG: 333 TABLET, DELAYED RELEASE ORAL at 15:08

## 2020-04-04 RX ADMIN — NICOTINE POLACRILEX 2 MG: 2 GUM, CHEWING BUCCAL at 11:17

## 2020-04-04 RX ADMIN — NICOTINE POLACRILEX 2 MG: 2 GUM, CHEWING BUCCAL at 14:36

## 2020-04-04 RX ADMIN — OLANZAPINE 15 MG: 10 TABLET, FILM COATED ORAL at 21:28

## 2020-04-04 RX ADMIN — ACAMPROSATE CALCIUM 666 MG: 333 TABLET, DELAYED RELEASE ORAL at 08:26

## 2020-04-04 RX ADMIN — SERTRALINE 100 MG: 100 TABLET, FILM COATED ORAL at 08:26

## 2020-04-04 RX ADMIN — ACAMPROSATE CALCIUM 666 MG: 333 TABLET, DELAYED RELEASE ORAL at 21:28

## 2020-04-04 ASSESSMENT — PAIN SCALES - GENERAL
PAINLEVEL_OUTOF10: 0
PAINLEVEL_OUTOF10: 0

## 2020-04-04 NOTE — PROGRESS NOTES
03/31/2020 11.5  % Final    Eosinophils % 03/31/2020 1.7  % Final    Basophils % 03/31/2020 1.0  % Final    Neutrophils Absolute 03/31/2020 3.4  1.7 - 7.7 K/uL Final    Lymphocytes Absolute 03/31/2020 2.1  1.0 - 5.1 K/uL Final    Monocytes Absolute 03/31/2020 0.7  0.0 - 1.3 K/uL Final    Eosinophils Absolute 03/31/2020 0.1  0.0 - 0.6 K/uL Final    Basophils Absolute 03/31/2020 0.1  0.0 - 0.2 K/uL Final    Sodium 03/31/2020 135* 136 - 145 mmol/L Final    Potassium 03/31/2020 4.5  3.5 - 5.1 mmol/L Final    Chloride 03/31/2020 100  99 - 110 mmol/L Final    CO2 03/31/2020 20* 21 - 32 mmol/L Final    Anion Gap 03/31/2020 15  3 - 16 Final    Glucose 03/31/2020 82  70 - 99 mg/dL Final    BUN 03/31/2020 10  7 - 20 mg/dL Final    CREATININE 03/31/2020 0.6  0.6 - 1.1 mg/dL Final    GFR Non- 03/31/2020 >60  >60 Final    Comment: >60 mL/min/1.73m2 EGFR, calc. for ages 25 and older using the  MDRD formula (not corrected for weight), is valid for stable  renal function.  GFR  03/31/2020 >60  >60 Final    Comment: Chronic Kidney Disease: less than 60 ml/min/1.73 sq.m. Kidney Failure: less than 15 ml/min/1.73 sq.m. Results valid for patients 18 years and older.       Calcium 03/31/2020 9.5  8.3 - 10.6 mg/dL Final    Total Protein 03/31/2020 7.8  6.4 - 8.2 g/dL Final    Alb 03/31/2020 4.4  3.4 - 5.0 g/dL Final    Albumin/Globulin Ratio 03/31/2020 1.3  1.1 - 2.2 Final    Total Bilirubin 03/31/2020 0.3  0.0 - 1.0 mg/dL Final    Alkaline Phosphatase 03/31/2020 54  40 - 129 U/L Final    ALT 03/31/2020 18  10 - 40 U/L Final    AST 03/31/2020 16  15 - 37 U/L Final    Globulin 03/31/2020 3.4  g/dL Final    Ethanol Lvl 03/31/2020 None Detected  mg/dL Final    Comment:    None Detected  Conversion factor:  100 mg/dl = .100 g/dl  For Medical Purposes Only      Amphetamine Screen, Urine 03/31/2020 Neg  Negative <1000ng/mL Final    Barbiturate Screen, Ur 03/31/2020 Neg Negative <200 ng/mL Final    Benzodiazepine Screen, Urine 03/31/2020 Neg  Negative <200 ng/mL Final    Cannabinoid Scrn, Ur 03/31/2020 POSITIVE* Negative <50 ng/mL Final    Cocaine Metabolite Screen, Urine 03/31/2020 Neg  Negative <300 ng/mL Final    Opiate Scrn, Ur 03/31/2020 Neg  Negative <300 ng/mL Final    Comment: \"Therapeutic levels of pain medication, especially oxycontin and synthetic  opioids, may not be detected by this Methodology. Pain management screen  panel  Drug panel-PM-Hi Res Ur, Interp (PAIN) should be considered for drug  monitoring \".  PCP Screen, Urine 03/31/2020 Neg  Negative <25 ng/mL Final    Methadone Screen, Urine 03/31/2020 Neg  Negative <300 ng/mL Final    Propoxyphene Scrn, Ur 03/31/2020 Neg  Negative <300 ng/mL Final    Oxycodone Urine 03/31/2020 Neg  Negative <100 ng/ml Final    pH, UA 03/31/2020 6.0   Final    Comment: Urine pH less than 5.0 or greater than 8.0 may indicate sample adulteration. Another sample should be collected if clinically  indicated.  Drug Screen Comment: 03/31/2020 see below   Final    Comment: This method is a screening test to detect only these drug  classes as part of a medical workup. Confirmatory testing  by another method should be ordered if clinically indicated.       HCG(Urine) Pregnancy Test 03/31/2020 Negative  Detects HCG level >20 MIU/mL Final    Comment: Note:  Always repeat results in question with a serum  quantitative pregnancy test. A serum hCG is positive  2-5 days before the urine hCG test.      Valproic Acid Lvl 03/31/2020 <2.8* 50.0 - 100.0 ug/mL Final    TSH 03/31/2020 2.72  0.27 - 4.20 uIU/mL Final    Hemoglobin A1C 03/31/2020 4.7  See comment % Final    Comment: Comment:  Diagnosis of Diabetes: > or = 6.5%  Increased risk of diabetes (Prediabetes): 5.7-6.4%  Glycemic Control: Nonpregnant Adults: <7.0%                    Pregnant: <6.0%        eAG 03/31/2020 88.2  mg/dL Final    Cholesterol, Total 04/01/2020 202* 0 - 199 mg/dL Final    Triglycerides 04/01/2020 80  0 - 150 mg/dL Final    HDL 04/01/2020 64* 40 - 60 mg/dL Final    LDL Calculated 04/01/2020 122* <100 mg/dL Final    VLDL Cholesterol Calculated 04/01/2020 16  Not Established mg/dL Final    Urine Trichomonas Evaluation 04/01/2020 None Seen   Final    Color, UA 04/01/2020 Yellow  Straw/Yellow Final    Clarity, UA 04/01/2020 Clear  Clear Final    Glucose, Ur 04/01/2020 Negative  Negative mg/dL Final    Bilirubin Urine 04/01/2020 Negative  Negative Final    Ketones, Urine 04/01/2020 Negative  Negative mg/dL Final    Specific [place], UA 04/01/2020 1.020  1.005 - 1.030 Final    Blood, Urine 04/01/2020 Negative  Negative Final    pH, UA 04/01/2020 7.0  5.0 - 8.0 Final    Protein, UA 04/01/2020 Negative  Negative mg/dL Final    Urobilinogen, Urine 04/01/2020 0.2  <2.0 E.U./dL Final    Nitrite, Urine 04/01/2020 Negative  Negative Final    Leukocyte Esterase, Urine 04/01/2020 MODERATE* Negative Final    Microscopic Examination 04/01/2020 YES   Final    Urine Type 04/01/2020 NotGiven   Final    Urine received in a container without preservatives.     Urine Reflex to Culture 04/01/2020 Yes   Final    Urine Culture, Routine 04/01/2020 No growth at 18-36 hours   Final    WBC, UA 04/01/2020 21-50* 0 - 5 /HPF Final    RBC, UA 04/01/2020 0-2  0 - 4 /HPF Final    Bacteria, UA 04/01/2020 2+* None Seen /HPF Final    Trichomonas, UA 04/01/2020 None Seen  None Seen /HPF Final    C. trachomatis DNA ,Urine 04/01/2020 Negative  Negative Final    N. gonorrhoeae DNA, Urine 04/01/2020 Negative  Negative Final            Medications  Current Facility-Administered Medications: chlorproMAZINE (THORAZINE) tablet 25 mg, 25 mg, Oral, TID PRN  sertraline (ZOLOFT) tablet 100 mg, 100 mg, Oral, Daily  OLANZapine (ZYPREXA) tablet 15 mg, 15 mg, Oral, Nightly  acamprosate (CAMPRAL) tablet 666 mg, 666 mg, Oral, TID  nicotine polacrilex (NICORETTE) gum 2 mg, 2 mg, Oral,

## 2020-04-04 NOTE — PROGRESS NOTES
1:1 Assessment 10 minutes. Patient is alert and oriented x 4. Uses direct eye contact and is dressed appropriately in street clothing. Patient denied SI/HI,A/V hallucinations. The patient stated her mood was:\"It's been OK today\". Patient did attend groups this shift and participated appropriately. The patient does understand why she is here. Patient is medication compliant. Judgement,insight and impulse control are limited. Patient denied any physical complaints this evening. Vital signs are noted. Safety checks continue Q 15 minutes throughout the shift. PRNS this shift? Trazodone 50 mg po = effective. .  Patient obtained 8.25 hours of sleep this shift.

## 2020-04-04 NOTE — PLAN OF CARE
Problem: Altered Mood, Depressive Behavior:  Goal: Able to verbalize and/or display a decrease in depressive symptoms  Description: Able to verbalize and/or display a decrease in depressive symptoms  Note: Patient declines she is experiencing any hallucinations. Appear well kept. In room reading at times but has been visible on unit but keeps to self. Problem: Suicide risk  Description: Suicide risk  Goal: Provide patient with safe environment  Description: Provide patient with safe environment  Note: Patient remains safe on unit, declines suicidal ideation.

## 2020-04-04 NOTE — PROGRESS NOTES
Patient is currently resting quietly in bed. Appears to be asleep. Medication is noted to be effective.

## 2020-04-05 PROCEDURE — 6370000000 HC RX 637 (ALT 250 FOR IP): Performed by: NURSE PRACTITIONER

## 2020-04-05 PROCEDURE — 6370000000 HC RX 637 (ALT 250 FOR IP): Performed by: PSYCHIATRY & NEUROLOGY

## 2020-04-05 PROCEDURE — 6370000000 HC RX 637 (ALT 250 FOR IP): Performed by: PHYSICIAN ASSISTANT

## 2020-04-05 PROCEDURE — 1240000000 HC EMOTIONAL WELLNESS R&B

## 2020-04-05 RX ORDER — DOXYCYCLINE HYCLATE 100 MG
100 TABLET ORAL EVERY 12 HOURS SCHEDULED
Status: DISCONTINUED | OUTPATIENT
Start: 2020-04-05 | End: 2020-04-07 | Stop reason: HOSPADM

## 2020-04-05 RX ADMIN — NICOTINE POLACRILEX 2 MG: 2 GUM, CHEWING BUCCAL at 16:04

## 2020-04-05 RX ADMIN — ACAMPROSATE CALCIUM 666 MG: 333 TABLET, DELAYED RELEASE ORAL at 21:16

## 2020-04-05 RX ADMIN — DOXYCYCLINE HYCLATE 100 MG: 100 TABLET, FILM COATED ORAL at 10:20

## 2020-04-05 RX ADMIN — ACAMPROSATE CALCIUM 666 MG: 333 TABLET, DELAYED RELEASE ORAL at 08:46

## 2020-04-05 RX ADMIN — SERTRALINE 100 MG: 100 TABLET, FILM COATED ORAL at 08:46

## 2020-04-05 RX ADMIN — OLANZAPINE 15 MG: 10 TABLET, FILM COATED ORAL at 21:16

## 2020-04-05 RX ADMIN — NICOTINE POLACRILEX 2 MG: 2 GUM, CHEWING BUCCAL at 17:49

## 2020-04-05 RX ADMIN — NICOTINE POLACRILEX 2 MG: 2 GUM, CHEWING BUCCAL at 09:55

## 2020-04-05 RX ADMIN — DOXYCYCLINE HYCLATE 100 MG: 100 TABLET, FILM COATED ORAL at 21:16

## 2020-04-05 RX ADMIN — NICOTINE POLACRILEX 2 MG: 2 GUM, CHEWING BUCCAL at 13:17

## 2020-04-05 RX ADMIN — ACAMPROSATE CALCIUM 666 MG: 333 TABLET, DELAYED RELEASE ORAL at 14:23

## 2020-04-05 RX ADMIN — TRAZODONE HYDROCHLORIDE 50 MG: 50 TABLET ORAL at 21:16

## 2020-04-05 ASSESSMENT — PAIN SCALES - GENERAL: PAINLEVEL_OUTOF10: 0

## 2020-04-05 NOTE — GROUP NOTE
Group Therapy Note    Date: 4/5/2020    Group Start Time: 7386  Group End Time: 200  Group Topic: Cognitive Skills    [facility]    Padmaja Rodriguez, [facility]        Group Therapy Note    Attendees: 10       Patient's Goal:  Pt's goal was to learn ways to cope with grief and ways to remember loved ones. Notes:  Pt participated in group discussion and activity. Pt met goal.     Status After Intervention:  Improved    Participation Level:  Active Listener and Interactive    Participation Quality: Appropriate and Attentive      Speech:  normal      Thought Process/Content: Logical  Linear      Affective Functioning: Congruent      Mood: anxious      Level of consciousness:  Alert      Response to Learning: Able to verbalize current knowledge/experience and Progressing to goal      Endings: None Reported    Modes of Intervention: Education, Support, Socialization, Exploration, Clarifying, Problem-solving, Activity, Movement, Confrontation, Limit-setting and Reality-testing      Discipline Responsible: /Counselor      Signature:  Padmaja Rodriguez, [facility]

## 2020-04-06 PROCEDURE — 6370000000 HC RX 637 (ALT 250 FOR IP): Performed by: PSYCHIATRY & NEUROLOGY

## 2020-04-06 PROCEDURE — 99233 SBSQ HOSP IP/OBS HIGH 50: CPT | Performed by: PSYCHIATRY & NEUROLOGY

## 2020-04-06 PROCEDURE — 6370000000 HC RX 637 (ALT 250 FOR IP): Performed by: PHYSICIAN ASSISTANT

## 2020-04-06 PROCEDURE — 1240000000 HC EMOTIONAL WELLNESS R&B

## 2020-04-06 PROCEDURE — 6370000000 HC RX 637 (ALT 250 FOR IP): Performed by: NURSE PRACTITIONER

## 2020-04-06 RX ORDER — DIMETHICONE, OXYBENZONE, AND PADIMATE O 2; 2.5; 6.6 G/100G; G/100G; G/100G
STICK TOPICAL PRN
Status: DISCONTINUED | OUTPATIENT
Start: 2020-04-06 | End: 2020-04-07 | Stop reason: HOSPADM

## 2020-04-06 RX ORDER — GUANFACINE 1 MG/1
1 TABLET ORAL 2 TIMES DAILY
Status: DISCONTINUED | OUTPATIENT
Start: 2020-04-06 | End: 2020-04-07 | Stop reason: HOSPADM

## 2020-04-06 RX ADMIN — GUANFACINE 1 MG: 1 TABLET ORAL at 20:55

## 2020-04-06 RX ADMIN — TRAZODONE HYDROCHLORIDE 50 MG: 50 TABLET ORAL at 20:56

## 2020-04-06 RX ADMIN — NICOTINE POLACRILEX 2 MG: 2 GUM, CHEWING BUCCAL at 17:30

## 2020-04-06 RX ADMIN — ACAMPROSATE CALCIUM 666 MG: 333 TABLET, DELAYED RELEASE ORAL at 13:59

## 2020-04-06 RX ADMIN — ACAMPROSATE CALCIUM 666 MG: 333 TABLET, DELAYED RELEASE ORAL at 08:53

## 2020-04-06 RX ADMIN — DOXYCYCLINE HYCLATE 100 MG: 100 TABLET, FILM COATED ORAL at 20:55

## 2020-04-06 RX ADMIN — OLANZAPINE 15 MG: 10 TABLET, FILM COATED ORAL at 20:55

## 2020-04-06 RX ADMIN — ACAMPROSATE CALCIUM 666 MG: 333 TABLET, DELAYED RELEASE ORAL at 20:55

## 2020-04-06 RX ADMIN — NICOTINE POLACRILEX 2 MG: 2 GUM, CHEWING BUCCAL at 09:28

## 2020-04-06 RX ADMIN — Medication: at 19:11

## 2020-04-06 RX ADMIN — SERTRALINE 100 MG: 100 TABLET, FILM COATED ORAL at 08:53

## 2020-04-06 RX ADMIN — GUANFACINE 1 MG: 1 TABLET ORAL at 10:17

## 2020-04-06 RX ADMIN — DOXYCYCLINE HYCLATE 100 MG: 100 TABLET, FILM COATED ORAL at 08:53

## 2020-04-06 RX ADMIN — NICOTINE POLACRILEX 2 MG: 2 GUM, CHEWING BUCCAL at 13:58

## 2020-04-06 NOTE — PLAN OF CARE
Problem: Suicide risk  Goal: Provide patient with safe environment  Description: Provide patient with safe environment  Outcome: Ongoing   Patient denied SI/HI, said she felt safe in the [facility]. Problem: Pain:  Goal: Pain level will decrease  Description: Pain level will decrease  Outcome: Ongoing  Goal: Control of acute pain  Description: Control of acute pain  Outcome: Ongoing  Goal: Control of chronic pain  Description: Control of chronic pain  Outcome: Ongoing   No complaint of pain this evening. Patient is asked to come to the staff if pain occurs. Problem: Altered Mood, Depressive Behavior:  Goal: Able to verbalize acceptance of life and situations over which he or she has no control  Description: Able to verbalize acceptance of life and situations over which he or she has no control  4/5/2020 2233 by Claudine Castellanos RN  Outcome: Ongoing  4/5/2020 1914 by Sly Sy LPN  Outcome: Ongoing  Goal: Able to verbalize and/or display a decrease in depressive symptoms  Description: Able to verbalize and/or display a decrease in depressive symptoms  Outcome: Ongoing  Goal: Absence of self-harm  Description: Absence of self-harm  Outcome: Ongoing   Patent noted to isolate this evening. Patient said she has a lot on her mind and she has been thinking about discharge. She remains committed to going to rehab and realizes it will take some work to get herself where she wants to be. Patient said that she is looking forward to getting to the next phase of recovery. Patient was asked to come to the staff if thoughts of self harm would occur this evening. She is in agreement with plan.

## 2020-04-06 NOTE — PROGRESS NOTES
Department of Psychiatry  Attending Progress Note  Chief Complaint: follow-up Pt stated that she feels better but stated that her depression come in waves. She feels like one minute she is ok and then she succumbs to this feeling of emptiness. Pt stated that she has been having difficulty with her concentration and we talked about possibility of using tenex to help concetration and her anxiety and she was agreeable. Pt stated that [facility] might have a bed open and we decided that in lieu of new meds to stay here until tomorrow and discharge to [facility] at that time. Will cont zoloft and zyprexa and starte tenex at this time as well to help with treatment of depressive sx's and anxiety. Patient's chart was reviewed and collaborated with  about the treatment plan. SUBJECTIVE:    Patient is feelingbetterSuicidal ideation:denies. Patient does not have medication side effects. ROS: Patient has new complaints: no  Sleeping adequately:  Yes   Appetite adequate: yes  Attending groups:yes  Visitors:No    OBJECTIVE    Physical  VITALS:  BP (!) 143/63   Pulse 76   Temp 97.7 °F (36.5 °C) (Oral)   Resp 16   Ht 5' 3\" (1.6 m)   Wt 150 lb (68 kg)   SpO2 99%   BMI 26.57 kg/m²     Mental Status Examination:  Patients appearance was well-appearing, street clothes, in chair, good grooming and good hygiene. Thoughts are Logical. Homicidal ideations none. No abnormal movements, tics or mannerisms. Memory intact [place] 0. Concentration Poor. Alert and oriented X 4. Insight and Judgement limited. Patient was cooperative.  Patient gait normal. Mood better, affect blunted affect Hallucinations Absent, suicidal ideations no specific plan to harm self Speech fluent and spontaneous    Labs:   Admission on 03/31/2020   Component Date Value Ref Range Status    WBC 03/31/2020 6.5  4.0 - 11.0 K/uL Final    RBC 03/31/2020 4.89  4.00 - 5.20 M/uL Final    Hemoglobin 03/31/2020 15.1  12.0 - 16.0 g/dL Final    Hematocrit 03/31/2020 44.7  36.0 - 48.0 % Final    MCV 03/31/2020 91.3  80.0 - 100.0 fL Final    MCH 03/31/2020 30.9  26.0 - 34.0 pg Final    MCHC 03/31/2020 33.8  31.0 - 36.0 g/dL Final    RDW 03/31/2020 13.9  12.4 - 15.4 % Final    Platelets 86/94/2408 221  135 - 450 K/uL Final    MPV 03/31/2020 8.8  5.0 - 10.5 fL Final    Neutrophils % 03/31/2020 52.7  % Final    Lymphocytes % 03/31/2020 33.1  % Final    Monocytes % 03/31/2020 11.5  % Final    Eosinophils % 03/31/2020 1.7  % Final    Basophils % 03/31/2020 1.0  % Final    Neutrophils Absolute 03/31/2020 3.4  1.7 - 7.7 K/uL Final    Lymphocytes Absolute 03/31/2020 2.1  1.0 - 5.1 K/uL Final    Monocytes Absolute 03/31/2020 0.7  0.0 - 1.3 K/uL Final    Eosinophils Absolute 03/31/2020 0.1  0.0 - 0.6 K/uL Final    Basophils Absolute 03/31/2020 0.1  0.0 - 0.2 K/uL Final    Sodium 03/31/2020 135* 136 - 145 mmol/L Final    Potassium 03/31/2020 4.5  3.5 - 5.1 mmol/L Final    Chloride 03/31/2020 100  99 - 110 mmol/L Final    CO2 03/31/2020 20* 21 - 32 mmol/L Final    Anion Gap 03/31/2020 15  3 - 16 Final    Glucose 03/31/2020 82  70 - 99 mg/dL Final    BUN 03/31/2020 10  7 - 20 mg/dL Final    CREATININE 03/31/2020 0.6  0.6 - 1.1 mg/dL Final    GFR Non- 03/31/2020 >60  >60 Final    Comment: >60 mL/min/1.73m2 EGFR, calc. for ages 25 and older using the  MDRD formula (not corrected for weight), is valid for stable  renal function.  GFR  03/31/2020 >60  >60 Final    Comment: Chronic Kidney Disease: less than 60 ml/min/1.73 sq.m. Kidney Failure: less than 15 ml/min/1.73 sq.m. Results valid for patients 18 years and older.       Calcium 03/31/2020 9.5  8.3 - 10.6 mg/dL Final    Total Protein 03/31/2020 7.8  6.4 - 8.2 g/dL Final    Alb 03/31/2020 4.4  3.4 - 5.0 g/dL Final    Albumin/Globulin Ratio 03/31/2020 1.3  1.1 - 2.2 Final    Total Bilirubin 03/31/2020 0.3  0.0 - 1.0 mg/dL Final    Alkaline

## 2020-04-06 NOTE — GROUP NOTE
Group Therapy Note    Date: 4/6/2020    Group Start Time: 1000  Group End Time: 6208  Group Topic: Cognitive Skills    [address] NIMISHA Bashir        Group Therapy Note    Attendees: 9    Group goal: discussion on the meaning of radical acceptance and applying to our lives      Notes:  Pt participated in group discussion. Pt met goal.    Status After Intervention:  Improved    Participation Level:  Active Listener and Interactive    Participation Quality: Appropriate, Attentive, Sharing and Supportive      Speech:  normal      Thought Process/Content: Logical      Affective Functioning: Congruent      Mood: anxious      Level of consciousness:  Alert      Response to Learning: Able to verbalize current knowledge/experience, Able to verbalize/acknowledge new learning and Progressing to goal      Endings: None Reported    Modes of Intervention: Education, Support, Socialization, Exploration, Clarifying and Problem-solving      Discipline Responsible: /Counselor      Signature:  NIMISHA Raphael

## 2020-04-07 VITALS
WEIGHT: 150 LBS | HEIGHT: 63 IN | TEMPERATURE: 97.8 F | RESPIRATION RATE: 16 BRPM | SYSTOLIC BLOOD PRESSURE: 104 MMHG | BODY MASS INDEX: 26.58 KG/M2 | HEART RATE: 87 BPM | DIASTOLIC BLOOD PRESSURE: 53 MMHG | OXYGEN SATURATION: 98 %

## 2020-04-07 PROCEDURE — 99239 HOSP IP/OBS DSCHRG MGMT >30: CPT | Performed by: PSYCHIATRY & NEUROLOGY

## 2020-04-07 PROCEDURE — 6370000000 HC RX 637 (ALT 250 FOR IP): Performed by: PHYSICIAN ASSISTANT

## 2020-04-07 PROCEDURE — 6370000000 HC RX 637 (ALT 250 FOR IP): Performed by: PSYCHIATRY & NEUROLOGY

## 2020-04-07 PROCEDURE — 5130000000 HC BRIDGE APPOINTMENT

## 2020-04-07 RX ORDER — SERTRALINE HYDROCHLORIDE 100 MG/1
100 TABLET, FILM COATED ORAL DAILY
Qty: 30 TABLET | Refills: 0 | Status: SHIPPED | OUTPATIENT
Start: 2020-04-08

## 2020-04-07 RX ORDER — DOXYCYCLINE HYCLATE 100 MG
100 TABLET ORAL EVERY 12 HOURS SCHEDULED
Qty: 14 TABLET | Refills: 0 | Status: SHIPPED | OUTPATIENT
Start: 2020-04-07 | End: 2020-04-14

## 2020-04-07 RX ORDER — GUANFACINE 1 MG/1
1 TABLET ORAL 2 TIMES DAILY
Qty: 60 TABLET | Refills: 0 | Status: SHIPPED | OUTPATIENT
Start: 2020-04-07

## 2020-04-07 RX ORDER — OLANZAPINE 15 MG/1
15 TABLET ORAL NIGHTLY
Qty: 30 TABLET | Refills: 0 | Status: SHIPPED | OUTPATIENT
Start: 2020-04-07

## 2020-04-07 RX ORDER — ACAMPROSATE CALCIUM 333 MG/1
666 TABLET, DELAYED RELEASE ORAL 3 TIMES DAILY
Qty: 180 TABLET | Refills: 0 | Status: SHIPPED | OUTPATIENT
Start: 2020-04-07

## 2020-04-07 RX ADMIN — SERTRALINE 100 MG: 100 TABLET, FILM COATED ORAL at 08:46

## 2020-04-07 RX ADMIN — ACAMPROSATE CALCIUM 666 MG: 333 TABLET, DELAYED RELEASE ORAL at 08:46

## 2020-04-07 RX ADMIN — DOXYCYCLINE HYCLATE 100 MG: 100 TABLET, FILM COATED ORAL at 08:45

## 2020-04-07 RX ADMIN — NICOTINE POLACRILEX 2 MG: 2 GUM, CHEWING BUCCAL at 09:43

## 2020-04-07 RX ADMIN — GUANFACINE 1 MG: 1 TABLET ORAL at 08:44

## 2020-04-07 NOTE — BH NOTE
Group Therapy Note    Date: 4/6/2020  Start Time: 20:00  End Time:  21:00  Number of Participants: 5    Type of Group: Recreational   Wrap up    Wellness Binder Information  Module Name:  /  Session Number:  /    Patient's Goal:  Go to group    Notes:  Goal completed    Status After Intervention:  Unchanged    Participation Level:  Active Listener    Participation Quality: Appropriate      Speech:  normal      Thought Process/Content: Logical      Affective Functioning: Blunted      Mood: stable      Level of consciousness:  Alert, Oriented x4 and Attentive      Response to Learning: Able to change behavior      Endings: None Reported    Modes of Intervention: Socialization and Problem-solving      Discipline Responsible: Behavorial Health Tech      Signature:  Rogelio Cage
Patients educated on use of procedural mask while in day area. Patients were educated on CDC guidelines. Patients were educated on how to properly put on and take of procedural masks. Patients were educated on proper hand hygiene. Patients were educated on how to properly store procedural masks.
Reported the Thorazine has been effective in reducing increased anxiety.
Writer reviewed referral with [facility] who wanted confirmation that she was not experiencing any SI and to confirm her medication list. They requested that pt complete their phone screening so that they can get her scheduled for admission. Writer called unit to have pt call [facility].     Yeny Hollins MSW, LSW
Status EXAM upon discharge:  Status and Exam  Normal: Yes  Facial Expression: Flat, Brightened  Affect: Appropriate, Congruent  Level of Consciousness: Alert  Mood:Normal: Yes  Mood: Other (Comment)(WNL; anxious about discharge/rehab)  Motor Activity:Normal: Yes  Motor Activity: Decreased  Interview Behavior: Cooperative  Preception: [place] to Person, Faythe Chimes to Time, [place] to Place, [place] to Situation  Attention:Normal: Yes  Attention: Distractible, Unable to Concentrate  Thought Processes: Other(See comment)(WNL; logical/linear)  Thought Content:Normal: Yes  Thought Content: Poverty of Content  Hallucinations: None  Delusions: No  Memory:Normal: No  Memory: Poor Recent  Insight and Judgment: Yes  Insight and Judgment: Other(See comment)(WNL; IMPROVED)  Present Suicidal Ideation: No  Present Homicidal Ideation: No      Metabolic Screening:    Lab Results   Component Value Date    LABA1C 4.7 03/31/2020       Lab Results   Component Value Date    CHOL 202 (H) 04/01/2020    CHOL 217 (H) 02/29/2020     Lab Results   Component Value Date    TRIG 80 04/01/2020    TRIG 155 (H) 02/29/2020     Lab Results   Component Value Date    HDL 64 (H) 04/01/2020    HDL 44 02/29/2020     No components found for: [facility]  Lab Results   Component Value Date    LABVLDL 16 04/01/2020    LABVLDL 31 02/29/2020       Russ Adames RN
[id number] Exp Problem Focused - Mod. Complex

## 2020-04-07 NOTE — PLAN OF CARE
Problem: Altered Mood, Depressive Behavior:  Goal: Able to verbalize and/or display a decrease in depressive symptoms  Description: Able to verbalize and/or display a decrease in depressive symptoms  Outcome: Ongoing   Alla Bella was visible in the milieu this evening. She watched TV for much of the evening before and after group. Alla Bella was compliant with her evening medications. She continues to present with depressed mood and flat affect. Alla Bella stated that she expects to discharge on Tues to a rehab program that is \"near my house\". Alla Bella was unable to tell this nurse anything else about the program  Alla Bella denied suicidal and homicidal ideation this evening.

## 2020-04-09 NOTE — DISCHARGE SUMMARY
that this man Morales Sheehan is just a friend. Shahab Lew suggests they are together and have been together continuously for the past 3 weeks. Jenaine Washington is thought blocking and slow to respond to assessment questions.  She then states she had to get away from Morales Sheehan because she is \"not allowed to be around him. \"  She can not elaborate on this but begins to cry.  When this writer states she will talk to the on-call psychiatrist she becomes irritable, flailing her arms, loudly and rapidly stating \"like I dont know, I can't talk about it and I feel like I need to talk my mind is going crazy they keep telling me im going to hell! \"  She is crying uncontrollably at this time. Jeanine Washington states \"im hearing things around me telling me im going in the fire and need to burn in hell! \"  She denies internal stimuli however it appears as though patient is experiencing auditory hallucinations. Hospital Course Pt was admitted due to psychosis, si. Pt has been using which might have contributed to her decompensation. Pt also not taking her medications. Initially pt was restarted on her zyprexa and zoloft but pt felt there were not helping and she was started on depakote, risperdal and buspar. Pt attended grps and was social with staff and peers. She had uneventful hospital course. Pt wanted to go back to work and she stated that she will be going to [employer]. Patient stabilized on meds and milieu treatment. Patient was discharged to home to continue recovery in the community.    PE: (reviewed) and labs (see medical H&PE)  Labs:    Admission on 02/28/2020, Discharged on 03/09/2020   Component Date Value Ref Range Status    Amphetamine Screen, Urine 02/28/2020 Neg  Negative <1000ng/mL Final    Barbiturate Screen, Ur 02/28/2020 Neg  Negative <200 ng/mL Final    Benzodiazepine Screen, Urine 02/28/2020 Neg  Negative <200 ng/mL Final    Cannabinoid Scrn, Ur 02/28/2020 POSITIVE* Negative <50 ng/mL Final    Cocaine Metabolite Screen, Urine 02/28/2020 Neg Negative <300 ng/mL Final    Opiate Scrn, Ur 02/28/2020 Neg  Negative <300 ng/mL Final    Comment: \"Therapeutic levels of pain medication, especially oxycontin and synthetic  opioids, may not be detected by this Methodology. Pain management screen  panel  Drug panel-PM-Hi Res Ur, Interp (PAIN) should be considered for drug  monitoring \".  PCP Screen, Urine 02/28/2020 Neg  Negative <25 ng/mL Final    Methadone Screen, Urine 02/28/2020 Neg  Negative <300 ng/mL Final    Propoxyphene Scrn, Ur 02/28/2020 Neg  Negative <300 ng/mL Final    Oxycodone Urine 02/28/2020 Neg  Negative <100 ng/ml Final    pH, UA 02/28/2020 5.0   Final    Comment: Urine pH less than 5.0 or greater than 8.0 may indicate sample adulteration. Another sample should be collected if clinically  indicated.  Drug Screen Comment: 02/28/2020 see below   Final    Comment: This method is a screening test to detect only these drug  classes as part of a medical workup. Confirmatory testing  by another method should be ordered if clinically indicated.  Sodium 02/28/2020 137  136 - 145 mmol/L Final    Potassium reflex Magnesium 02/28/2020 3.7  3.5 - 5.1 mmol/L Final    Chloride 02/28/2020 99  99 - 110 mmol/L Final    CO2 02/28/2020 21  21 - 32 mmol/L Final    Anion Gap 02/28/2020 17* 3 - 16 Final    Glucose 02/28/2020 96  70 - 99 mg/dL Final    BUN 02/28/2020 8  7 - 20 mg/dL Final    CREATININE 02/28/2020 0.6  0.6 - 1.1 mg/dL Final    GFR Non- 02/28/2020 >60  >60 Final    Comment: >60 mL/min/1.73m2 EGFR, calc. for ages 25 and older using the  MDRD formula (not corrected for weight), is valid for stable  renal function.  GFR  02/28/2020 >60  >60 Final    Comment: Chronic Kidney Disease: less than 60 ml/min/1.73 sq.m. Kidney Failure: less than 15 ml/min/1.73 sq.m. Results valid for patients 18 years and older.       Calcium 02/28/2020 9.8  8.3 - 10.6 mg/dL Final    Total Protein 02/28/2020 8.1  6.4 - 8.2 g/dL Final    Alb 02/28/2020 4.8  3.4 - 5.0 g/dL Final    Albumin/Globulin Ratio 02/28/2020 1.5  1.1 - 2.2 Final    Total Bilirubin 02/28/2020 0.4  0.0 - 1.0 mg/dL Final    Alkaline Phosphatase 02/28/2020 53  40 - 129 U/L Final    ALT 02/28/2020 22  10 - 40 U/L Final    AST 02/28/2020 23  15 - 37 U/L Final    Globulin 02/28/2020 3.3  g/dL Final    Ethanol Lvl 02/28/2020 None Detected  mg/dL Final    Comment:    None Detected  Conversion factor:  100 mg/dl = .100 g/dl  For Medical Purposes Only      Acetaminophen Level 02/28/2020 <5* 10 - 30 ug/mL Final    Comment: Therapeutic Range: 10.0-30.0 ug/mL  Toxic: >=454 ug/mL      Salicylate, Serum 59/99/8817 0.4* 15.0 - 30.0 mg/dL Final    Comment: Therapeutic Range: 15.0-30.0 mg/dL  Toxic: >30.0 mg/dL      WBC 02/28/2020 6.9  4.0 - 11.0 K/uL Final    RBC 02/28/2020 5.06  4.00 - 5.20 M/uL Final    Hemoglobin 02/28/2020 15.4  12.0 - 16.0 g/dL Final    Hematocrit 02/28/2020 45.1  36.0 - 48.0 % Final    MCV 02/28/2020 89.2  80.0 - 100.0 fL Final    MCH 02/28/2020 30.4  26.0 - 34.0 pg Final    MCHC 02/28/2020 34.1  31.0 - 36.0 g/dL Final    RDW 02/28/2020 13.4  12.4 - 15.4 % Final    Platelets 94/40/8679 255  135 - 450 K/uL Final    MPV 02/28/2020 8.6  5.0 - 10.5 fL Final    Neutrophils % 02/28/2020 61.7  % Final    Lymphocytes % 02/28/2020 29.3  % Final    Monocytes % 02/28/2020 7.7  % Final    Eosinophils % 02/28/2020 0.9  % Final    Basophils % 02/28/2020 0.4  % Final    Neutrophils Absolute 02/28/2020 4.2  1.7 - 7.7 K/uL Final    Lymphocytes Absolute 02/28/2020 2.0  1.0 - 5.1 K/uL Final    Monocytes Absolute 02/28/2020 0.5  0.0 - 1.3 K/uL Final    Eosinophils Absolute 02/28/2020 0.1  0.0 - 0.6 K/uL Final    Basophils Absolute 02/28/2020 0.0  0.0 - 0.2 K/uL Final    hCG Qual 02/28/2020 Negative  Detects HCG level >10 MIU/mL Final    Cholesterol, Total 02/29/2020 217* 0 - 199 mg/dL Final    Triglycerides 02/29/2020 155* 0 - 150 mg/dL Final    HDL 02/29/2020 44  40 - 60 mg/dL Final    LDL Calculated 02/29/2020 142* <100 mg/dL Final    VLDL Cholesterol Calculated 02/29/2020 31  Not Established mg/dL Final    Hemoglobin A1C 02/29/2020 4.8  See comment % Final    Comment: Comment:  Diagnosis of Diabetes: > or = 6.5%  Increased risk of diabetes (Prediabetes): 5.7-6.4%  Glycemic Control: Nonpregnant Adults: <7.0%                    Pregnant: <6.0%        eAG 02/29/2020 91.1  mg/dL Final    Ventricular Rate 02/29/2020 69  BPM Final    Atrial Rate 02/29/2020 69  BPM Final    P-R Interval 02/29/2020 112  ms Final    QRS Duration 02/29/2020 84  ms Final    Q-T Interval 02/29/2020 410  ms Final    QTc Calculation (Bazett) 02/29/2020 439  ms Final    P Axis 02/29/2020 44  degrees Final    R Axis 02/29/2020 59  degrees Final    T Axis 02/29/2020 31  degrees Final    Diagnosis 02/29/2020 Normal sinus rhythmNormal ECGNo previous ECGs availableConfirmed by Kalyn Brooks MD, Breezy Aragon ([id number]) on 2/29/2020 4:03:12 PM   Final    C. trachomatis DNA ,Urine 03/06/2020 Negative  Negative Final    N. gonorrhoeae DNA, Urine 03/06/2020 Negative  Negative Final    Urine Trichomonas Evaluation 03/06/2020 None Seen   Final    Color, UA 03/06/2020 Straw  Straw/Yellow Final    Clarity, UA 03/06/2020 Clear  Clear Final    Glucose, Ur 03/06/2020 Negative  Negative mg/dL Final    Bilirubin Urine 03/06/2020 Negative  Negative Final    Ketones, Urine 03/06/2020 Negative  Negative mg/dL Final    Specific Thor, UA 03/06/2020 1.010  1.005 - 1.030 Final    Blood, Urine 03/06/2020 Negative  Negative Final    pH, UA 03/06/2020 6.0  5.0 - 8.0 Final    Protein, UA 03/06/2020 Negative  Negative mg/dL Final    Urobilinogen, Urine 03/06/2020 0.2  <2.0 E.U./dL Final    Nitrite, Urine 03/06/2020 Negative  Negative Final    Leukocyte Esterase, Urine 03/06/2020 Negative  Negative Final    Microscopic Examination 03/06/2020 Not Indicated   Final    Urine Jennifer Risk:  no specific plan to harm self Pt has phone numbers to contact if suicidal thoughts recur and states pt will return to the hospital if suicidal feelings return.    Hospital Routine Meds:     Hospital PRN Meds:    Discharge Meds:    Discharge Medication List as of 3/31/2020  4:12 PM           Details   divalproex (DEPAKOTE) 250 MG DR tablet Take 1 tablet by mouth 3 times daily, Disp-90 tablet, R-0Normal      busPIRone (BUSPAR) 10 MG tablet Take 1 tablet by mouth 3 times daily, Disp-90 tablet, R-0Normal      risperiDONE (RISPERDAL M-TABS) 2 MG disintegrating tablet Take 1 tablet by mouth 2 times daily, Disp-60 tablet, R-0Normal                 Disposition - Residence      Follow Up:  See Discharge Instructions

## 2020-05-26 ENCOUNTER — HOSPITAL ENCOUNTER (EMERGENCY)
Age: 30
Discharge: HOME | End: 2020-05-26
Attending: EMERGENCY MEDICINE
Payer: COMMERCIAL

## 2020-05-26 VITALS
HEART RATE: 83 BPM | BODY MASS INDEX: 31.44 KG/M2 | SYSTOLIC BLOOD PRESSURE: 128 MMHG | WEIGHT: 188.71 LBS | HEIGHT: 65 IN | RESPIRATION RATE: 19 BRPM | TEMPERATURE: 98.7 F | DIASTOLIC BLOOD PRESSURE: 76 MMHG | OXYGEN SATURATION: 96 %

## 2020-05-26 LAB — HCG(URINE) PREGNANCY TEST: NEGATIVE

## 2020-05-26 PROCEDURE — 84703 CHORIONIC GONADOTROPIN ASSAY: CPT

## 2020-05-26 PROCEDURE — 99282 EMERGENCY DEPT VISIT SF MDM: CPT

## 2020-05-26 PROCEDURE — 6370000000 HC RX 637 (ALT 250 FOR IP): Performed by: EMERGENCY MEDICINE

## 2020-05-26 RX ORDER — ONDANSETRON 4 MG/1
4 TABLET, ORALLY DISINTEGRATING ORAL ONCE
Status: COMPLETED | OUTPATIENT
Start: 2020-05-26 | End: 2020-05-26

## 2020-05-26 RX ORDER — SULFAMETHOXAZOLE AND TRIMETHOPRIM 800; 160 MG/1; MG/1
1 TABLET ORAL 2 TIMES DAILY
Qty: 14 TABLET | Refills: 0 | Status: SHIPPED | OUTPATIENT
Start: 2020-05-26 | End: 2020-06-02

## 2020-05-26 RX ORDER — ONDANSETRON 4 MG/1
4 TABLET, ORALLY DISINTEGRATING ORAL EVERY 8 HOURS PRN
Qty: 15 TABLET | Refills: 0 | Status: SHIPPED | OUTPATIENT
Start: 2020-05-26

## 2020-05-26 RX ORDER — SULFAMETHOXAZOLE AND TRIMETHOPRIM 800; 160 MG/1; MG/1
1 TABLET ORAL ONCE
Status: COMPLETED | OUTPATIENT
Start: 2020-05-26 | End: 2020-05-26

## 2020-05-26 RX ADMIN — ONDANSETRON 4 MG: 4 TABLET, ORALLY DISINTEGRATING ORAL at 14:16

## 2020-05-26 RX ADMIN — SULFAMETHOXAZOLE AND TRIMETHOPRIM 1 TABLET: 800; 160 TABLET ORAL at 14:16

## 2020-05-26 ASSESSMENT — PAIN DESCRIPTION - DESCRIPTORS: DESCRIPTORS: THROBBING;SHARP

## 2020-05-26 ASSESSMENT — ENCOUNTER SYMPTOMS
COLOR CHANGE: 0
STRIDOR: 0
TROUBLE SWALLOWING: 0
VOICE CHANGE: 0
FACIAL SWELLING: 0
NAUSEA: 1
VOMITING: 0
SHORTNESS OF BREATH: 0
ABDOMINAL PAIN: 0
WHEEZING: 0

## 2020-05-26 ASSESSMENT — PAIN SCALES - GENERAL
PAINLEVEL_OUTOF10: 7
PAINLEVEL_OUTOF10: 7

## 2020-05-26 ASSESSMENT — PAIN DESCRIPTION - PAIN TYPE
TYPE: ACUTE PAIN
TYPE: ACUTE PAIN

## 2020-05-26 ASSESSMENT — PAIN DESCRIPTION - LOCATION
LOCATION: LEG
LOCATION: LEG

## 2020-05-26 ASSESSMENT — PAIN - FUNCTIONAL ASSESSMENT: PAIN_FUNCTIONAL_ASSESSMENT: 0-10

## 2020-05-26 ASSESSMENT — PAIN DESCRIPTION - ORIENTATION: ORIENTATION: RIGHT

## 2020-05-26 NOTE — ED PROVIDER NOTES
DISPOSITION/PLAN   DISPOSITION Decision To Discharge 05/26/2020 02:11:29 PM      PATIENT REFERRED TO:  CHRISTOS Ford - CNP  [address]  [address] [address] [address]    In 1 week      [place] [address]  [phone]    If symptoms worsen      DISCHARGE MEDICATIONS:  New Prescriptions    ONDANSETRON (ZOFRAN ODT) 4 MG DISINTEGRATING TABLET    Take 1 tablet by mouth every 8 hours as needed for Nausea    SULFAMETHOXAZOLE-TRIMETHOPRIM (BACTRIM DS) 800-160 MG PER TABLET    Take 1 tablet by mouth 2 times daily for 7 days          (Please note that portions of this note were completed with a voice recognition program.  Efforts were made to edit the dictations but occasionally words aremis-transcribed. )    Sebastian Harp MD (electronically signed)  Attending Emergency Physician       Sebastian Harp MD  05/26/20 2426

## 2020-06-03 ENCOUNTER — HOSPITAL ENCOUNTER (EMERGENCY)
Age: 30
Discharge: HOME | End: 2020-06-03
Attending: EMERGENCY MEDICINE
Payer: COMMERCIAL

## 2020-06-03 VITALS
HEIGHT: 66 IN | HEART RATE: 92 BPM | BODY MASS INDEX: 29.58 KG/M2 | SYSTOLIC BLOOD PRESSURE: 137 MMHG | OXYGEN SATURATION: 98 % | TEMPERATURE: 98.1 F | RESPIRATION RATE: 18 BRPM | DIASTOLIC BLOOD PRESSURE: 90 MMHG | WEIGHT: 184.08 LBS

## 2020-06-03 LAB
BACTERIA WET PREP: ABNORMAL
BACTERIA: ABNORMAL /HPF
BILIRUBIN URINE: ABNORMAL
BLOOD, URINE: NEGATIVE
CLARITY: ABNORMAL
CLUE CELLS: ABNORMAL
COLOR: YELLOW
CRYSTALS, UA: ABNORMAL /HPF
EPITHELIAL CELLS WET PREP: ABNORMAL
EPITHELIAL CELLS, UA: ABNORMAL /HPF (ref 0–5)
GLUCOSE URINE: NEGATIVE MG/DL
HCG(URINE) PREGNANCY TEST: NEGATIVE
KETONES, URINE: ABNORMAL MG/DL
LEUKOCYTE ESTERASE, URINE: ABNORMAL
MICROSCOPIC EXAMINATION: YES
NITRITE, URINE: NEGATIVE
PH UA: 5.5 (ref 5–8)
PROTEIN UA: NEGATIVE MG/DL
RBC UA: ABNORMAL /HPF (ref 0–4)
RBC WET PREP: ABNORMAL
SOURCE WET PREP: ABNORMAL
SPECIFIC GRAVITY UA: >=1.03 (ref 1–1.03)
TRICHOMONAS PREP: ABNORMAL
TRICHOMONAS: PRESENT /HPF
URINE REFLEX TO CULTURE: YES
URINE TYPE: ABNORMAL
UROBILINOGEN, URINE: 1 E.U./DL
WBC UA: ABNORMAL /HPF (ref 0–5)
WBC WET PREP: ABNORMAL
YEAST WET PREP: ABNORMAL

## 2020-06-03 PROCEDURE — 99283 EMERGENCY DEPT VISIT LOW MDM: CPT

## 2020-06-03 PROCEDURE — 87491 CHLMYD TRACH DNA AMP PROBE: CPT

## 2020-06-03 PROCEDURE — 96372 THER/PROPH/DIAG INJ SC/IM: CPT

## 2020-06-03 PROCEDURE — 87210 SMEAR WET MOUNT SALINE/INK: CPT

## 2020-06-03 PROCEDURE — 87591 N.GONORRHOEAE DNA AMP PROB: CPT

## 2020-06-03 PROCEDURE — 84703 CHORIONIC GONADOTROPIN ASSAY: CPT

## 2020-06-03 PROCEDURE — 6360000002 HC RX W HCPCS: Performed by: EMERGENCY MEDICINE

## 2020-06-03 PROCEDURE — 81001 URINALYSIS AUTO W/SCOPE: CPT

## 2020-06-03 PROCEDURE — 87086 URINE CULTURE/COLONY COUNT: CPT

## 2020-06-03 RX ORDER — METRONIDAZOLE 500 MG/1
500 TABLET ORAL 2 TIMES DAILY
Qty: 14 TABLET | Refills: 0 | Status: SHIPPED | OUTPATIENT
Start: 2020-06-03 | End: 2020-06-10

## 2020-06-03 RX ORDER — ACETAMINOPHEN 500 MG
500 TABLET ORAL EVERY 6 HOURS PRN
Qty: 30 TABLET | Refills: 0 | Status: SHIPPED | OUTPATIENT
Start: 2020-06-03

## 2020-06-03 RX ORDER — CEFTRIAXONE SODIUM 250 MG/1
250 INJECTION, POWDER, FOR SOLUTION INTRAMUSCULAR; INTRAVENOUS ONCE
Status: COMPLETED | OUTPATIENT
Start: 2020-06-03 | End: 2020-06-03

## 2020-06-03 RX ORDER — AZITHROMYCIN 250 MG/1
1000 TABLET, FILM COATED ORAL ONCE
Qty: 4 TABLET | Refills: 0 | Status: SHIPPED | OUTPATIENT
Start: 2020-06-03 | End: 2020-06-03

## 2020-06-03 RX ORDER — CEPHALEXIN 500 MG/1
500 CAPSULE ORAL 4 TIMES DAILY
Qty: 28 CAPSULE | Refills: 0 | Status: SHIPPED | OUTPATIENT
Start: 2020-06-03 | End: 2020-06-10

## 2020-06-03 RX ADMIN — CEFTRIAXONE SODIUM 250 MG: 250 INJECTION, POWDER, FOR SOLUTION INTRAMUSCULAR; INTRAVENOUS at 18:15

## 2020-06-03 NOTE — ED PROVIDER NOTES
might be an abscess but with color power Doppler the patient had a blood vessel -not fluid underneath the skin. Patient has multiple weeks of vaginal discharge with a benign abdominal exam.  The patient was self swabbed. Work-up of the patient revealed trichomoniasis. I spoke to the patient about the risk and benefit of empiric treatment for gonorrhea and chlamydia versus waiting on her results. At this time she wishes to be empirically treated for both gonorrhea and chlamydia because she is not certain that she will have transportation to return to our emergency department in a timely manner. Patient was given Rocephin, azithromycin while in the ED. I discussed results and plan of care with patient and family. I do feel patient can be safely discharged to home. Recommend follow up with [facility] in 12-14 days for re-evaluation and testing for HIV and syphilis. Reasons to RT ED discussed. Patient expresses understanding and is in agreement with plan. Patient was given scripts for the following medications. I counseled patient how to take these medications. New Prescriptions    AZITHROMYCIN (ZITHROMAX) 250 MG TABLET    Take 4 tablets by mouth once for 1 dose    CEPHALEXIN (KEFLEX) 500 MG CAPSULE    Take 1 capsule by mouth 4 times daily for 7 days    METRONIDAZOLE (FLAGYL) 500 MG TABLET    Take 1 tablet by mouth 2 times daily for 7 days           CLINICAL IMPRESSION  1. Trichomoniasis    2. Cellulitis of right lower extremity    3. Encounter for assessment of STD exposure        Blood pressure (!) 137/90, pulse 92, temperature 98.1 °F (36.7 °C), temperature source Oral, resp. rate 18, height 5' 6\" (1.676 m), weight 184 lb 1.4 oz (83.5 kg), SpO2 98 %. DISPOSITION  Patient was discharged to home in good condition.         Ac Garcia MD  06/03/20 4435       Ac Garcia MD  06/03/20 2969

## 2020-06-04 LAB — URINE CULTURE, ROUTINE: NORMAL

## 2020-06-05 LAB
C TRACH DNA GENITAL QL NAA+PROBE: NEGATIVE
N. GONORRHOEAE DNA: NEGATIVE

## 2021-03-13 ENCOUNTER — HOSPITAL ENCOUNTER (EMERGENCY)
Age: 31
Discharge: HOME | End: 2021-03-13
Attending: EMERGENCY MEDICINE
Payer: COMMERCIAL

## 2021-03-13 VITALS
HEIGHT: 63 IN | RESPIRATION RATE: 12 BRPM | DIASTOLIC BLOOD PRESSURE: 71 MMHG | SYSTOLIC BLOOD PRESSURE: 106 MMHG | WEIGHT: 215.83 LBS | HEART RATE: 82 BPM | BODY MASS INDEX: 38.24 KG/M2 | OXYGEN SATURATION: 95 % | TEMPERATURE: 97.6 F

## 2021-03-13 DIAGNOSIS — F19.10 POLYSUBSTANCE ABUSE (HCC): Primary | ICD-10-CM

## 2021-03-13 DIAGNOSIS — T40.601A OPIATE OVERDOSE, ACCIDENTAL OR UNINTENTIONAL, INITIAL ENCOUNTER (HCC): ICD-10-CM

## 2021-03-13 LAB
AMPHETAMINE SCREEN, URINE: ABNORMAL
BACTERIA: ABNORMAL /HPF
BARBITURATE SCREEN URINE: ABNORMAL
BENZODIAZEPINE SCREEN, URINE: ABNORMAL
BILIRUBIN URINE: NEGATIVE
BLOOD, URINE: ABNORMAL
CANNABINOID SCREEN URINE: POSITIVE
CLARITY: ABNORMAL
COCAINE METABOLITE SCREEN URINE: POSITIVE
COLOR: YELLOW
EPITHELIAL CELLS, UA: ABNORMAL /HPF (ref 0–5)
GLUCOSE URINE: NEGATIVE MG/DL
HCG(URINE) PREGNANCY TEST: NEGATIVE
HYALINE CASTS: ABNORMAL /LPF (ref 0–2)
KETONES, URINE: NEGATIVE MG/DL
LEUKOCYTE ESTERASE, URINE: NEGATIVE
Lab: ABNORMAL
METHADONE SCREEN, URINE: ABNORMAL
MICROSCOPIC EXAMINATION: YES
NITRITE, URINE: NEGATIVE
OPIATE SCREEN URINE: POSITIVE
OXYCODONE URINE: ABNORMAL
PH UA: 5.5
PH UA: 5.5 (ref 5–8)
PHENCYCLIDINE SCREEN URINE: ABNORMAL
PROPOXYPHENE SCREEN: ABNORMAL
PROTEIN UA: ABNORMAL MG/DL
RBC UA: ABNORMAL /HPF (ref 0–4)
SPECIFIC GRAVITY UA: >=1.03 (ref 1–1.03)
URINE REFLEX TO CULTURE: ABNORMAL
URINE TYPE: ABNORMAL
UROBILINOGEN, URINE: 0.2 E.U./DL
WBC UA: ABNORMAL /HPF (ref 0–5)

## 2021-03-13 PROCEDURE — 6370000000 HC RX 637 (ALT 250 FOR IP): Performed by: EMERGENCY MEDICINE

## 2021-03-13 PROCEDURE — 84703 CHORIONIC GONADOTROPIN ASSAY: CPT

## 2021-03-13 PROCEDURE — 99284 EMERGENCY DEPT VISIT MOD MDM: CPT

## 2021-03-13 PROCEDURE — 80307 DRUG TEST PRSMV CHEM ANLYZR: CPT

## 2021-03-13 PROCEDURE — 81001 URINALYSIS AUTO W/SCOPE: CPT

## 2021-03-13 RX ORDER — ONDANSETRON 4 MG/1
4 TABLET, ORALLY DISINTEGRATING ORAL ONCE
Status: COMPLETED | OUTPATIENT
Start: 2021-03-13 | End: 2021-03-13

## 2021-03-13 RX ADMIN — ONDANSETRON 4 MG: 4 TABLET, ORALLY DISINTEGRATING ORAL at 07:58

## 2021-03-13 NOTE — ED PROVIDER NOTES
eMERGENCY dEPARTMENT eNCOUnter      Pt Name: Sharmin Sauer  MRN: [id number]  [place] 1990  Date of evaluation: 3/13/2021  Provider: Alberto Munguia MD     [address] [place] [address]       Chief Complaint   Patient presents with    Drug Overdose     unintentional drug overdose. Pt took heroin and maybe some crack. found outside. 4mg Narcan given IN by EMS. Pt A&Ox4 on arrival.         HISTORY OF PRESENT ILLNESS   (Location/Symptom, Timing/Onset,Context/Setting, Quality, Duration, Modifying Factors, Severity) Note limiting factors. HPI    Sharmin Sauer is a 31 y.o. female who presents to the emergency department with bipolar disorder and drug abuse presents with overdose. Patient apparently states he was on the streets and took some crack cocaine. Patient denies any heroin use. Patient has prior history of polysubstance abuse. Patient was brought in by EMS. Patient was given 4 mg of nasal Narcan. Patient is awake alert but still drowsy. Her vital signs normal.  She is answering questions appropriately. Patient tells me that she is from [place]. And according to her notes she usually goes to a [employer]. Patient has no complaint at this time. Patient will be monitored. Nursing Notes were reviewed. REVIEW OFSYSTEMS    (2+ for level 4; 10+ for level 5)   Review of Systems    General: No fevers, chills or night sweats, No weight loss    Head:  No Sore throat,  No Ear Pain    Chest:  Nontender. No Cough, No SOB,  Chest Pain    GI: No abdominal pain or vomiting    : No dysuria or hematuria    Musculoskeletal: No unrelenting pain or night pain    Neurologic: No bowel or bladder incontinence, No saddle anesthesia, No leg weakness    All other systems reviewed and are negative.         PAST MEDICAL HISTORY     Past Medical History:   Diagnosis Date    Hepatitis C 03/07/2020       SURGICAL HISTORY       Past Surgical History:   Procedure Laterality Date    MOUTH SURGERY         CURRENT MEDICATIONS       Previous Medications    ACAMPROSATE (CAMPRAL) 333 MG TABLET    Take 2 tablets by mouth 3 times daily    ACETAMINOPHEN (TYLENOL) 500 MG TABLET    Take 1 tablet by mouth every 6 hours as needed for Pain    GUANFACINE (TENEX) 1 MG TABLET    Take 1 tablet by mouth 2 times daily    OLANZAPINE (ZYPREXA) 15 MG TABLET    Take 1 tablet by mouth nightly    ONDANSETRON (ZOFRAN ODT) 4 MG DISINTEGRATING TABLET    Take 1 tablet by mouth every 8 hours as needed for Nausea    SERTRALINE (ZOLOFT) 100 MG TABLET    Take 1 tablet by mouth daily       ALLERGIES     Patient has no known allergies. FAMILY HISTORY     History reviewed. No pertinent family history.      SOCIAL HISTORY       Social History     Socioeconomic History    Marital status: Single     Spouse name: None    Number of children: 0    Years of education: None    Highest education level: None   Occupational History    None   Social Needs    Financial resource strain: None    Food insecurity     Worry: None     Inability: None    Transportation needs     Medical: None     Non-medical: None   Tobacco Use    Smoking status: Current Every Day Smoker     Packs/day: 1.00     Types: Cigarettes    Smokeless tobacco: Never Used   Substance and Sexual Activity    Alcohol use: Not Currently    Drug use: Yes     Types: Marijuana     Comment: last used Heroin 4/2020    Sexual activity: Not Currently   Lifestyle    Physical activity     Days per week: None     Minutes per session: None    Stress: None   Relationships    Social connections     Talks on phone: None     Gets together: None     Attends [employer] service: None     Active member of club or organization: None     Attends meetings of clubs or organizations: None     Relationship status: None    Intimate partner violence     Fear of current or ex partner: None     Emotionally abused: None     Physically abused: None     Forced sexual activity: None   Other Topics Concern    None   Social normal limits    Narrative:     Performed at:  [facility]) - [facility]  [address] [place], [place] [facility]   Phone [phone]   MICROSCOPIC URINALYSIS - Abnormal; Notable for the following components:    Hyaline Casts, UA 6-10 (*)     RBC, UA 5-10 (*)     Epithelial Cells, UA 6-10 (*)     Bacteria, UA Rare (*)     All other components within normal limits    Narrative:     Performed at:  [facility]) [facility]  [address] [place], [facility]   Phone [phone]   PREGNANCY, URINE    Narrative:     Performed at:  [facility]) [facility]  [address] [place], [facility]   Phone [phone]        All other labs were within normal range or not returned as of this dictation. Procedures      EMERGENCY DEPARTMENT COURSE and DIFFERENTIAL DIAGNOSIS/MDM:   Vitals:    Vitals:    03/13/21 0655 03/13/21 0800 03/13/21 0845 03/13/21 0847   BP: 112/64 103/60  106/71   Pulse: 102 94 83 82   Resp: 18 12  12   Temp: 97.6 °F (36.4 °C)      TempSrc: Oral      SpO2: 93% 95% 95% 95%   Weight: 215 lb 13.3 oz (97.9 kg)      Height: 5' 3\" (1.6 m)          Medications   ondansetron (ZOFRAN-ODT) disintegrating tablet 4 mg (4 mg Oral Given 3/13/21 0758)       MDM. Patient is a 29-year-old with history of polysubstance abuse had a overdose today. Patient is back to normal.  Was given Narcan. Patient is alert awake we did make a consult to [employer]. Patient did talk to [employer]. Patient will follow-up. Also arranging for disposition and home-going. Attempting to call mom. Patient is medically stable. She has a follow-up. And will be discharged. REVAL:         CRITICAL CARE TIME   Total CriticalCare time was 0 minutes, excluding separately reportable procedures.   There was a high probability of clinically significant/life threatening deterioration in the patient's condition which required my urgent intervention. CONSULTS:  None    PROCEDURES:  Unless otherwise noted below, none     [unfilled]    FINAL IMPRESSION      1. [facility] ([facility]    2. Opiate overdose, accidental or unintentional, initial encounter ([facility]          DISPOSITION/PLAN   DISPOSITION        PATIENT REFERRED TO:  [facility]    Schedule an appointment as soon as possible for a visit   If symptoms worsen      DISCHARGE MEDICATIONS:  New Prescriptions    No medications on file          (Please note:  Portions of this note were completed with a voice recognition program.Efforts were made to edit the dictations but occasionally words and phrases are mis-transcribed.)  Form v2016. J.5-cn    Letha GUTIERREZ MD (electronically signed)  Emergency Medicine Provider        Shelbie Gage MD  03/13/21 4574

## 2021-03-13 NOTE — ED NOTES
Awake up to bathroom to void in bathroom vomited in toilet water pt drank.      Carissa Ruth RN  03/13/21 7809

## 2022-10-30 ENCOUNTER — HOSPITAL ENCOUNTER (EMERGENCY)
Facility: HOSPITAL | Age: 32
Discharge: ANOTHER HOSPITAL | End: 2022-10-30
Attending: EMERGENCY MEDICINE | Admitting: STUDENT IN AN ORGANIZED HEALTH CARE EDUCATION/TRAINING PROGRAM

## 2022-10-30 ENCOUNTER — HOSPITAL ENCOUNTER (INPATIENT)
Facility: HOSPITAL | Age: 32
LOS: 4 days | Discharge: NURSING, REHAB OR LONG-TERM CARE | End: 2022-11-03
Attending: PSYCHIATRY & NEUROLOGY | Admitting: PSYCHIATRY & NEUROLOGY

## 2022-10-30 VITALS
DIASTOLIC BLOOD PRESSURE: 72 MMHG | SYSTOLIC BLOOD PRESSURE: 140 MMHG | HEART RATE: 105 BPM | WEIGHT: 188 LBS | RESPIRATION RATE: 18 BRPM | BODY MASS INDEX: 33.31 KG/M2 | OXYGEN SATURATION: 99 % | TEMPERATURE: 98.6 F | HEIGHT: 63 IN

## 2022-10-30 DIAGNOSIS — R45.851 DEPRESSION WITH SUICIDAL IDEATION: Primary | ICD-10-CM

## 2022-10-30 DIAGNOSIS — F32.A DEPRESSION WITH SUICIDAL IDEATION: Primary | ICD-10-CM

## 2022-10-30 DIAGNOSIS — F11.20 OPIOID USE DISORDER, SEVERE, DEPENDENCE: Primary | ICD-10-CM

## 2022-10-30 PROBLEM — F22 PARANOIA (PSYCHOSIS): Status: ACTIVE | Noted: 2022-10-30

## 2022-10-30 LAB
ALBUMIN SERPL-MCNC: 4.3 G/DL (ref 3.5–5.2)
ALBUMIN/GLOB SERPL: 1.4 G/DL
ALP SERPL-CCNC: 81 U/L (ref 39–117)
ALT SERPL W P-5'-P-CCNC: 42 U/L (ref 1–33)
AMPHET+METHAMPHET UR QL: NEGATIVE
AMPHETAMINES UR QL: NEGATIVE
ANION GAP SERPL CALCULATED.3IONS-SCNC: 14.4 MMOL/L (ref 5–15)
AST SERPL-CCNC: 29 U/L (ref 1–32)
BACTERIA UR QL AUTO: ABNORMAL /HPF
BARBITURATES UR QL SCN: NEGATIVE
BASOPHILS # BLD AUTO: 0.03 10*3/MM3 (ref 0–0.2)
BASOPHILS NFR BLD AUTO: 0.4 % (ref 0–1.5)
BENZODIAZ UR QL SCN: POSITIVE
BILIRUB SERPL-MCNC: 0.2 MG/DL (ref 0–1.2)
BILIRUB UR QL STRIP: NEGATIVE
BUN SERPL-MCNC: 8 MG/DL (ref 6–20)
BUN/CREAT SERPL: 11.6 (ref 7–25)
BUPRENORPHINE SERPL-MCNC: POSITIVE NG/ML
CALCIUM SPEC-SCNC: 9.2 MG/DL (ref 8.6–10.5)
CANNABINOIDS SERPL QL: POSITIVE
CHLORIDE SERPL-SCNC: 102 MMOL/L (ref 98–107)
CLARITY UR: CLEAR
CO2 SERPL-SCNC: 22.6 MMOL/L (ref 22–29)
COCAINE UR QL: NEGATIVE
COLOR UR: YELLOW
CREAT SERPL-MCNC: 0.69 MG/DL (ref 0.57–1)
DEPRECATED RDW RBC AUTO: 42.3 FL (ref 37–54)
EGFRCR SERPLBLD CKD-EPI 2021: 120.7 ML/MIN/1.73
EOSINOPHIL # BLD AUTO: 0.08 10*3/MM3 (ref 0–0.4)
EOSINOPHIL NFR BLD AUTO: 1 % (ref 0.3–6.2)
ERYTHROCYTE [DISTWIDTH] IN BLOOD BY AUTOMATED COUNT: 12.2 % (ref 12.3–15.4)
ETHANOL BLD-MCNC: <10 MG/DL (ref 0–10)
ETHANOL UR QL: <0.01 %
FLUAV RNA RESP QL NAA+PROBE: NOT DETECTED
FLUBV RNA RESP QL NAA+PROBE: NOT DETECTED
GLOBULIN UR ELPH-MCNC: 3.1 GM/DL
GLUCOSE SERPL-MCNC: 92 MG/DL (ref 65–99)
GLUCOSE UR STRIP-MCNC: NEGATIVE MG/DL
HCG SERPL QL: NEGATIVE
HCT VFR BLD AUTO: 42.6 % (ref 34–46.6)
HGB BLD-MCNC: 13.4 G/DL (ref 12–15.9)
HGB UR QL STRIP.AUTO: NEGATIVE
HYALINE CASTS UR QL AUTO: ABNORMAL /LPF
IMM GRANULOCYTES # BLD AUTO: 0.09 10*3/MM3 (ref 0–0.05)
IMM GRANULOCYTES NFR BLD AUTO: 1.1 % (ref 0–0.5)
KETONES UR QL STRIP: NEGATIVE
LEUKOCYTE ESTERASE UR QL STRIP.AUTO: ABNORMAL
LYMPHOCYTES # BLD AUTO: 2.49 10*3/MM3 (ref 0.7–3.1)
LYMPHOCYTES NFR BLD AUTO: 30.2 % (ref 19.6–45.3)
MAGNESIUM SERPL-MCNC: 1.9 MG/DL (ref 1.6–2.6)
MCH RBC QN AUTO: 29.5 PG (ref 26.6–33)
MCHC RBC AUTO-ENTMCNC: 31.5 G/DL (ref 31.5–35.7)
MCV RBC AUTO: 93.6 FL (ref 79–97)
METHADONE UR QL SCN: NEGATIVE
MONOCYTES # BLD AUTO: 0.44 10*3/MM3 (ref 0.1–0.9)
MONOCYTES NFR BLD AUTO: 5.3 % (ref 5–12)
NEUTROPHILS NFR BLD AUTO: 5.12 10*3/MM3 (ref 1.7–7)
NEUTROPHILS NFR BLD AUTO: 62 % (ref 42.7–76)
NITRITE UR QL STRIP: NEGATIVE
NRBC BLD AUTO-RTO: 0 /100 WBC (ref 0–0.2)
OPIATES UR QL: NEGATIVE
OXYCODONE UR QL SCN: NEGATIVE
PCP UR QL SCN: NEGATIVE
PH UR STRIP.AUTO: 6 [PH] (ref 5–8)
PLATELET # BLD AUTO: 239 10*3/MM3 (ref 140–450)
PMV BLD AUTO: 9.9 FL (ref 6–12)
POTASSIUM SERPL-SCNC: 3.5 MMOL/L (ref 3.5–5.2)
PROPOXYPH UR QL: NEGATIVE
PROT SERPL-MCNC: 7.4 G/DL (ref 6–8.5)
PROT UR QL STRIP: NEGATIVE
RBC # BLD AUTO: 4.55 10*6/MM3 (ref 3.77–5.28)
RBC # UR STRIP: ABNORMAL /HPF
REF LAB TEST METHOD: ABNORMAL
SARS-COV-2 RNA RESP QL NAA+PROBE: NOT DETECTED
SODIUM SERPL-SCNC: 139 MMOL/L (ref 136–145)
SP GR UR STRIP: 1.01 (ref 1–1.03)
SQUAMOUS #/AREA URNS HPF: ABNORMAL /HPF
TRICYCLICS UR QL SCN: POSITIVE
UROBILINOGEN UR QL STRIP: ABNORMAL
WBC # UR STRIP: ABNORMAL /HPF
WBC NRBC COR # BLD: 8.25 10*3/MM3 (ref 3.4–10.8)

## 2022-10-30 PROCEDURE — 25010000002 LORAZEPAM PER 2 MG: Performed by: EMERGENCY MEDICINE

## 2022-10-30 PROCEDURE — 36415 COLL VENOUS BLD VENIPUNCTURE: CPT

## 2022-10-30 PROCEDURE — C9803 HOPD COVID-19 SPEC COLLECT: HCPCS

## 2022-10-30 PROCEDURE — 84703 CHORIONIC GONADOTROPIN ASSAY: CPT | Performed by: EMERGENCY MEDICINE

## 2022-10-30 PROCEDURE — 80053 COMPREHEN METABOLIC PANEL: CPT | Performed by: EMERGENCY MEDICINE

## 2022-10-30 PROCEDURE — 99285 EMERGENCY DEPT VISIT HI MDM: CPT

## 2022-10-30 PROCEDURE — 83735 ASSAY OF MAGNESIUM: CPT | Performed by: EMERGENCY MEDICINE

## 2022-10-30 PROCEDURE — 93010 ELECTROCARDIOGRAM REPORT: CPT | Performed by: INTERNAL MEDICINE

## 2022-10-30 PROCEDURE — 82077 ASSAY SPEC XCP UR&BREATH IA: CPT | Performed by: EMERGENCY MEDICINE

## 2022-10-30 PROCEDURE — 96374 THER/PROPH/DIAG INJ IV PUSH: CPT

## 2022-10-30 PROCEDURE — 85025 COMPLETE CBC W/AUTO DIFF WBC: CPT | Performed by: EMERGENCY MEDICINE

## 2022-10-30 PROCEDURE — 25010000002 DIPHENHYDRAMINE PER 50 MG: Performed by: EMERGENCY MEDICINE

## 2022-10-30 PROCEDURE — 87636 SARSCOV2 & INF A&B AMP PRB: CPT | Performed by: EMERGENCY MEDICINE

## 2022-10-30 PROCEDURE — 80306 DRUG TEST PRSMV INSTRMNT: CPT | Performed by: EMERGENCY MEDICINE

## 2022-10-30 PROCEDURE — 99284 EMERGENCY DEPT VISIT MOD MDM: CPT

## 2022-10-30 PROCEDURE — 25010000002 DIPHENHYDRAMINE PER 50 MG

## 2022-10-30 PROCEDURE — 93005 ELECTROCARDIOGRAM TRACING: CPT | Performed by: PSYCHIATRY & NEUROLOGY

## 2022-10-30 PROCEDURE — 81001 URINALYSIS AUTO W/SCOPE: CPT | Performed by: EMERGENCY MEDICINE

## 2022-10-30 PROCEDURE — 96372 THER/PROPH/DIAG INJ SC/IM: CPT

## 2022-10-30 PROCEDURE — 25010000002 HALOPERIDOL LACTATE PER 5 MG: Performed by: EMERGENCY MEDICINE

## 2022-10-30 PROCEDURE — 25010000002 LORAZEPAM PER 2 MG

## 2022-10-30 PROCEDURE — 25010000002 HALOPERIDOL LACTATE PER 5 MG

## 2022-10-30 RX ORDER — DIPHENHYDRAMINE HYDROCHLORIDE 50 MG/ML
50 INJECTION INTRAMUSCULAR; INTRAVENOUS EVERY 6 HOURS PRN
Status: DISCONTINUED | OUTPATIENT
Start: 2022-10-30 | End: 2022-11-03 | Stop reason: HOSPADM

## 2022-10-30 RX ORDER — LORAZEPAM 2 MG/1
2 TABLET ORAL EVERY 6 HOURS PRN
Status: DISCONTINUED | OUTPATIENT
Start: 2022-10-30 | End: 2022-11-03 | Stop reason: HOSPADM

## 2022-10-30 RX ORDER — IBUPROFEN 400 MG/1
400 TABLET ORAL EVERY 6 HOURS PRN
Status: DISCONTINUED | OUTPATIENT
Start: 2022-10-30 | End: 2022-11-03 | Stop reason: HOSPADM

## 2022-10-30 RX ORDER — LOPERAMIDE HYDROCHLORIDE 2 MG/1
2 CAPSULE ORAL
Status: DISCONTINUED | OUTPATIENT
Start: 2022-10-30 | End: 2022-11-03 | Stop reason: HOSPADM

## 2022-10-30 RX ORDER — HALOPERIDOL 5 MG/1
5 TABLET ORAL EVERY 6 HOURS PRN
Status: DISCONTINUED | OUTPATIENT
Start: 2022-10-30 | End: 2022-11-03 | Stop reason: HOSPADM

## 2022-10-30 RX ORDER — TRAZODONE HYDROCHLORIDE 50 MG/1
50 TABLET ORAL NIGHTLY PRN
Status: DISCONTINUED | OUTPATIENT
Start: 2022-10-30 | End: 2022-11-03 | Stop reason: HOSPADM

## 2022-10-30 RX ORDER — DIPHENHYDRAMINE HYDROCHLORIDE 50 MG/ML
50 INJECTION INTRAMUSCULAR; INTRAVENOUS ONCE
Status: COMPLETED | OUTPATIENT
Start: 2022-10-30 | End: 2022-10-30

## 2022-10-30 RX ORDER — ACETAMINOPHEN 325 MG/1
650 TABLET ORAL EVERY 6 HOURS PRN
Status: DISCONTINUED | OUTPATIENT
Start: 2022-10-30 | End: 2022-11-03 | Stop reason: HOSPADM

## 2022-10-30 RX ORDER — ALUMINA, MAGNESIA, AND SIMETHICONE 2400; 2400; 240 MG/30ML; MG/30ML; MG/30ML
15 SUSPENSION ORAL EVERY 6 HOURS PRN
Status: DISCONTINUED | OUTPATIENT
Start: 2022-10-30 | End: 2022-11-03 | Stop reason: HOSPADM

## 2022-10-30 RX ORDER — HALOPERIDOL 5 MG/ML
5 INJECTION INTRAMUSCULAR EVERY 6 HOURS PRN
Status: DISCONTINUED | OUTPATIENT
Start: 2022-10-30 | End: 2022-11-03 | Stop reason: HOSPADM

## 2022-10-30 RX ORDER — BENZTROPINE MESYLATE 1 MG/ML
1 INJECTION INTRAMUSCULAR; INTRAVENOUS ONCE AS NEEDED
Status: DISCONTINUED | OUTPATIENT
Start: 2022-10-30 | End: 2022-11-03 | Stop reason: HOSPADM

## 2022-10-30 RX ORDER — ONDANSETRON 4 MG/1
4 TABLET, FILM COATED ORAL EVERY 6 HOURS PRN
Status: DISCONTINUED | OUTPATIENT
Start: 2022-10-30 | End: 2022-11-03 | Stop reason: HOSPADM

## 2022-10-30 RX ORDER — HALOPERIDOL 5 MG/ML
5 INJECTION INTRAMUSCULAR ONCE
Status: COMPLETED | OUTPATIENT
Start: 2022-10-30 | End: 2022-10-30

## 2022-10-30 RX ORDER — BENZONATATE 100 MG/1
100 CAPSULE ORAL 3 TIMES DAILY PRN
Status: DISCONTINUED | OUTPATIENT
Start: 2022-10-30 | End: 2022-11-03 | Stop reason: HOSPADM

## 2022-10-30 RX ORDER — LORAZEPAM 2 MG/ML
2 INJECTION INTRAMUSCULAR ONCE
Status: COMPLETED | OUTPATIENT
Start: 2022-10-30 | End: 2022-10-30

## 2022-10-30 RX ORDER — NICOTINE 21 MG/24HR
1 PATCH, TRANSDERMAL 24 HOURS TRANSDERMAL
Status: DISCONTINUED | OUTPATIENT
Start: 2022-10-30 | End: 2022-11-03 | Stop reason: HOSPADM

## 2022-10-30 RX ORDER — ECHINACEA PURPUREA EXTRACT 125 MG
2 TABLET ORAL AS NEEDED
Status: DISCONTINUED | OUTPATIENT
Start: 2022-10-30 | End: 2022-11-03 | Stop reason: HOSPADM

## 2022-10-30 RX ORDER — HYDROXYZINE 50 MG/1
50 TABLET, FILM COATED ORAL EVERY 6 HOURS PRN
Status: DISCONTINUED | OUTPATIENT
Start: 2022-10-30 | End: 2022-11-03 | Stop reason: HOSPADM

## 2022-10-30 RX ORDER — DIPHENHYDRAMINE HCL 25 MG
50 CAPSULE ORAL EVERY 6 HOURS PRN
Status: DISCONTINUED | OUTPATIENT
Start: 2022-10-30 | End: 2022-11-03 | Stop reason: HOSPADM

## 2022-10-30 RX ORDER — BENZTROPINE MESYLATE 1 MG/1
2 TABLET ORAL ONCE AS NEEDED
Status: DISCONTINUED | OUTPATIENT
Start: 2022-10-30 | End: 2022-11-03 | Stop reason: HOSPADM

## 2022-10-30 RX ORDER — LORAZEPAM 2 MG/ML
2 INJECTION INTRAMUSCULAR EVERY 6 HOURS PRN
Status: DISCONTINUED | OUTPATIENT
Start: 2022-10-30 | End: 2022-11-03 | Stop reason: HOSPADM

## 2022-10-30 RX ORDER — FAMOTIDINE 20 MG/1
20 TABLET, FILM COATED ORAL 2 TIMES DAILY PRN
Status: DISCONTINUED | OUTPATIENT
Start: 2022-10-30 | End: 2022-11-03 | Stop reason: HOSPADM

## 2022-10-30 RX ADMIN — LORAZEPAM 2 MG: 2 INJECTION INTRAMUSCULAR; INTRAVENOUS at 15:15

## 2022-10-30 RX ADMIN — LORAZEPAM 2 MG: 2 TABLET ORAL at 18:03

## 2022-10-30 RX ADMIN — HALOPERIDOL LACTATE 5 MG: 5 INJECTION, SOLUTION INTRAMUSCULAR at 15:15

## 2022-10-30 RX ADMIN — DIPHENHYDRAMINE HYDROCHLORIDE 50 MG: 50 INJECTION INTRAMUSCULAR; INTRAVENOUS at 15:14

## 2022-10-30 RX ADMIN — NICOTINE TRANSDERMAL SYSTEM 1 PATCH: 21 PATCH, EXTENDED RELEASE TRANSDERMAL at 18:02

## 2022-10-31 PROCEDURE — 99223 1ST HOSP IP/OBS HIGH 75: CPT | Performed by: PSYCHIATRY & NEUROLOGY

## 2022-10-31 RX ORDER — BUPRENORPHINE HYDROCHLORIDE AND NALOXONE HYDROCHLORIDE DIHYDRATE 8; 2 MG/1; MG/1
2 TABLET SUBLINGUAL DAILY
Status: CANCELLED | OUTPATIENT
Start: 2022-10-31

## 2022-10-31 RX ORDER — MELATONIN
1000 DAILY
COMMUNITY
End: 2022-11-06

## 2022-10-31 RX ORDER — BUSPIRONE HYDROCHLORIDE 15 MG/1
15 TABLET ORAL 2 TIMES DAILY
COMMUNITY
End: 2022-11-03 | Stop reason: HOSPADM

## 2022-10-31 RX ORDER — BUPRENORPHINE HYDROCHLORIDE AND NALOXONE HYDROCHLORIDE DIHYDRATE 8; 2 MG/1; MG/1
2 TABLET SUBLINGUAL DAILY
Status: DISCONTINUED | OUTPATIENT
Start: 2022-11-01 | End: 2022-11-03 | Stop reason: HOSPADM

## 2022-10-31 RX ORDER — FLUOXETINE HYDROCHLORIDE 40 MG/1
40 CAPSULE ORAL DAILY
Status: ON HOLD | COMMUNITY
End: 2022-11-03 | Stop reason: SDUPTHER

## 2022-10-31 RX ORDER — DIPHENOXYLATE HYDROCHLORIDE AND ATROPINE SULFATE 2.5; .025 MG/1; MG/1
TABLET ORAL DAILY
COMMUNITY
End: 2022-11-06

## 2022-10-31 RX ORDER — FLUOXETINE HYDROCHLORIDE 20 MG/1
40 CAPSULE ORAL DAILY
Status: DISCONTINUED | OUTPATIENT
Start: 2022-10-31 | End: 2022-11-03 | Stop reason: HOSPADM

## 2022-10-31 RX ORDER — OMEGA-3S/DHA/EPA/FISH OIL/D3 300MG-1000
1000 CAPSULE ORAL DAILY
Status: DISCONTINUED | OUTPATIENT
Start: 2022-10-31 | End: 2022-11-03 | Stop reason: HOSPADM

## 2022-10-31 RX ORDER — DIPHENOXYLATE HYDROCHLORIDE AND ATROPINE SULFATE 2.5; .025 MG/1; MG/1
1 TABLET ORAL DAILY
Status: DISCONTINUED | OUTPATIENT
Start: 2022-10-31 | End: 2022-11-03 | Stop reason: HOSPADM

## 2022-10-31 RX ORDER — BUPRENORPHINE HYDROCHLORIDE AND NALOXONE HYDROCHLORIDE DIHYDRATE 8; 2 MG/1; MG/1
2 TABLET SUBLINGUAL DAILY
COMMUNITY
End: 2022-11-03 | Stop reason: HOSPADM

## 2022-10-31 RX ORDER — BUPRENORPHINE HYDROCHLORIDE AND NALOXONE HYDROCHLORIDE DIHYDRATE 8; 2 MG/1; MG/1
1 TABLET SUBLINGUAL DAILY
Status: DISCONTINUED | OUTPATIENT
Start: 2022-10-31 | End: 2022-10-31

## 2022-10-31 RX ORDER — BUPRENORPHINE HYDROCHLORIDE AND NALOXONE HYDROCHLORIDE DIHYDRATE 8; 2 MG/1; MG/1
1 TABLET SUBLINGUAL ONCE
Status: COMPLETED | OUTPATIENT
Start: 2022-10-31 | End: 2022-10-31

## 2022-10-31 RX ADMIN — NICOTINE TRANSDERMAL SYSTEM 1 PATCH: 21 PATCH, EXTENDED RELEASE TRANSDERMAL at 08:01

## 2022-10-31 RX ADMIN — FLUOXETINE HYDROCHLORIDE 40 MG: 20 CAPSULE ORAL at 14:11

## 2022-10-31 RX ADMIN — BUPRENORPHINE HYDROCHLORIDE AND NALOXONE HYDROCHLORIDE DIHYDRATE 1 TABLET: 8; 2 TABLET SUBLINGUAL at 10:13

## 2022-10-31 RX ADMIN — LORAZEPAM 2 MG: 2 TABLET ORAL at 20:05

## 2022-10-31 RX ADMIN — LORAZEPAM 2 MG: 2 TABLET ORAL at 08:01

## 2022-10-31 RX ADMIN — BUSPIRONE HYDROCHLORIDE 15 MG: 10 TABLET ORAL at 20:05

## 2022-10-31 RX ADMIN — BUPRENORPHINE HYDROCHLORIDE AND NALOXONE HYDROCHLORIDE DIHYDRATE 1 TABLET: 8; 2 TABLET SUBLINGUAL at 14:11

## 2022-10-31 RX ADMIN — CHOLECALCIFEROL TAB 10 MCG (400 UNIT) 1000 UNITS: 10 TAB at 16:24

## 2022-10-31 RX ADMIN — HYDROXYZINE HYDROCHLORIDE 50 MG: 50 TABLET, FILM COATED ORAL at 14:19

## 2022-10-31 RX ADMIN — Medication 1 TABLET: at 14:19

## 2022-11-01 LAB
QT INTERVAL: 352 MS
QTC INTERVAL: 447 MS

## 2022-11-01 PROCEDURE — 63710000001 ONDANSETRON PER 8 MG: Performed by: PSYCHIATRY & NEUROLOGY

## 2022-11-01 PROCEDURE — 99232 SBSQ HOSP IP/OBS MODERATE 35: CPT | Performed by: PSYCHIATRY & NEUROLOGY

## 2022-11-01 RX ADMIN — HYDROXYZINE HYDROCHLORIDE 50 MG: 50 TABLET, FILM COATED ORAL at 10:45

## 2022-11-01 RX ADMIN — MAGNESIUM HYDROXIDE 10 ML: 2400 SUSPENSION ORAL at 12:09

## 2022-11-01 RX ADMIN — BUSPIRONE HYDROCHLORIDE 15 MG: 10 TABLET ORAL at 20:56

## 2022-11-01 RX ADMIN — CHOLECALCIFEROL TAB 10 MCG (400 UNIT) 1000 UNITS: 10 TAB at 08:07

## 2022-11-01 RX ADMIN — ONDANSETRON HYDROCHLORIDE 4 MG: 4 TABLET, FILM COATED ORAL at 12:09

## 2022-11-01 RX ADMIN — LORAZEPAM 2 MG: 2 TABLET ORAL at 14:02

## 2022-11-01 RX ADMIN — NICOTINE TRANSDERMAL SYSTEM 1 PATCH: 21 PATCH, EXTENDED RELEASE TRANSDERMAL at 08:06

## 2022-11-01 RX ADMIN — LORAZEPAM 2 MG: 2 TABLET ORAL at 20:56

## 2022-11-01 RX ADMIN — LORAZEPAM 2 MG: 2 TABLET ORAL at 08:08

## 2022-11-01 RX ADMIN — BUSPIRONE HYDROCHLORIDE 15 MG: 10 TABLET ORAL at 08:06

## 2022-11-01 RX ADMIN — BUPRENORPHINE HYDROCHLORIDE AND NALOXONE HYDROCHLORIDE DIHYDRATE 2 TABLET: 8; 2 TABLET SUBLINGUAL at 08:06

## 2022-11-01 RX ADMIN — Medication 1 TABLET: at 08:07

## 2022-11-01 RX ADMIN — FLUOXETINE HYDROCHLORIDE 40 MG: 20 CAPSULE ORAL at 08:06

## 2022-11-01 NOTE — PROGRESS NOTES
Navigator contacted [facility] and spoke with Dena. They are holding patients bed and will transport at discharge.     [facility] - [phone]

## 2022-11-01 NOTE — ED PROVIDER NOTES
Subjective   History of Present Illness  Pt c/o of depression, Suicidal thoughts      Pt reports command hallucinations telling her that she is worthless and she should kill herself.  Patient reports depression, anxiety, fear for safety concerns involved.  Patient reports she has not received her usual medication.  She reports significant history of trauma.    Pt has PMHx of substance abuse, schizoaffective disorder, ptsd, depression         History provided by:  Patient   used: No    Mental Health Problem  Presenting symptoms: depression    Degree of incapacity (severity):  Mild  Onset quality:  Sudden  Timing:  Constant  Progression:  Worsening  Chronicity:  New  Treatment compliance:  Untreated  Relieved by:  Nothing  Worsened by:  Nothing  Ineffective treatments:  None tried  Associated symptoms: no anxiety, no chest pain and no headaches    Risk factors: hx of mental illness        Review of Systems   Constitutional: Negative for chills and fever.   HENT: Negative for congestion, ear pain and sore throat.    Respiratory: Negative for cough, shortness of breath and wheezing.    Cardiovascular: Negative for chest pain.   Gastrointestinal: Negative for diarrhea, nausea and vomiting.   Genitourinary: Negative for dysuria and flank pain.   Skin: Negative for rash.   Neurological: Negative for headaches.   Psychiatric/Behavioral: The patient is not nervous/anxious.    All other systems reviewed and are negative.      Past Medical History:   Diagnosis Date   • Depression    • PTSD (post-traumatic stress disorder)    • Schizoaffective disorder (HCC)    • Substance abuse (HCC)    • Suicide attempt (HCC)        No Known Allergies    History reviewed. No pertinent surgical history.    History reviewed. No pertinent family history.    Social History     Socioeconomic History   • Marital status: Unknown   Tobacco Use   • Smoking status: Every Day     Packs/day: 0.50     Types: Cigarettes   • Smokeless  tobacco: Never   Vaping Use   • Vaping Use: Never used   Substance and Sexual Activity   • Alcohol use: Never   • Drug use: Not Currently     Types: Heroin, Methamphetamines   • Sexual activity: Not Currently     Partners: Female           Objective   Physical Exam  Vitals and nursing note reviewed.   Constitutional:       Appearance: She is well-developed.   HENT:      Head: Normocephalic.   Cardiovascular:      Rate and Rhythm: Normal rate and regular rhythm.   Pulmonary:      Effort: Pulmonary effort is normal.      Breath sounds: Normal breath sounds.   Abdominal:      General: Bowel sounds are normal.      Palpations: Abdomen is soft.      Tenderness: There is no abdominal tenderness.   Musculoskeletal:         General: Normal range of motion.      Cervical back: Neck supple.   Skin:     General: Skin is warm and dry.   Neurological:      Mental Status: She is alert and oriented to person, place, and time.   Psychiatric:         Mood and Affect: Mood is depressed.         Behavior: Behavior normal.         Thought Content: Thought content includes suicidal ideation.         Judgment: Judgment normal.         Procedures           ED Course                                           MDM    Final diagnoses:   Depression with suicidal ideation       ED Disposition  ED Disposition     ED Disposition   DC/Transfer to Behavioral Health    Condition   Stable    Comment   --             No follow-up provider specified.       Medication List      No changes were made to your prescriptions during this visit.          Nikia Craig PA  10/31/22 4988

## 2022-11-01 NOTE — PLAN OF CARE
DATA:      Therapist discussed case with Dr. Luna and met with patient today to review coping skills, review plan of care, and discuss discharge.    Therapist provided patient with psychoeducation and reading materials on healthy coping skills for anxiety; patient was receptive.      Clinical Maneuvering/Intervention:     Therapist assisted patient in processing above session content; acknowledged and normalized patient’s thoughts, feelings, and concerns.  Discussed the therapist/patient relationship and explain the parameters and limitations of relative confidentiality.  Also discussed the importance of active participation, and honesty to the treatment process.  Encouraged the patient to discuss/vent their feelings, frustrations, and fears concerning their ongoing medical issues and validated their feelings.     Allowed patient to freely discuss issues without interruption or judgment. Provided safe, confidential environment to facilitate the development of positive therapeutic relationship and encourage open, honest communication.      Therapist addressed discharge safety planning this date. Assisted patient in identifying risk factors which would indicate the need for higher level of care after discharge;  including thoughts to harm self or others and/or self-harming behavior. Encouraged patient to call 911, or present to the nearest emergency room should any of these events occur. Discussed crisis intervention services and means to access.  Encouraged securing any objects of harm.    Therapist completed integrated summary, treatment plan, and initiated social history this date.  Therapist is strongly encouraging family involvement in treatment.       Encouraged mask wearing, social distancing, and regular hand washing due to COVID19 risk.      ASSESSMENT:      Therapist met 1:1 with patient today. Patient denies suicidal ideation. Patient denies homicidal ideation. Patient denies AVH. Patient is calm and  cooperative today. She continues to seem lethargic, reports feeling tired today. Patient states she has been okay today. 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      PLAN:       Patient to remain hospitalized this date.      Treatment team will focus efforts on stabilizing patient's acute symptoms while providing education on healthy coping and crisis management to reduce hospitalizations.   Patient requires daily psychiatrist evaluation and 24/7 nursing supervision to promote patient  safety.     Therapist will offer 1-4 individual sessions, 1 therapy group daily, family education, and appropriate referral.

## 2022-11-01 NOTE — PLAN OF CARE
Problem: Adult Behavioral Health Plan of Care  Goal: Plan of Care Review  Outcome: Ongoing, Progressing  Flowsheets  Taken 11/1/2022 1549 by Wil Canela, RN  Outcome Evaluation: PATIENT VERBALIZES MODERATE ANXIETY, MILD DEPRESSION THIS SHIFT. PATIENT IS AOX3 AND COOPERATIVE WITH NO S/S OF ACUTE DISTRESS NOTED.  Taken 11/1/2022 0738 by Wil Canela, RN  Plan of Care Reviewed With: patient  Patient Agreement with Plan of Care: agrees  Taken 11/1/2022 0421 by Savanna Ibrahim RN  Progress: improving   Goal Outcome Evaluation:  Plan of Care Reviewed With: patient  Patient Agreement with Plan of Care: agrees     Progress: improving  Outcome Evaluation: PATIENT VERBALIZES MODERATE ANXIETY, MILD DEPRESSION THIS SHIFT. PATIENT IS AOX3 AND COOPERATIVE WITH NO S/S OF ACUTE DISTRESS NOTED.

## 2022-11-01 NOTE — PROGRESS NOTES
"INPATIENT PSYCHIATRIC PROGRESS NOTE    Name:  Linda Montgomery  :  1990  MRN:  [id number]  Visit Number:  [id number]  Length of stay:  2    SUBJECTIVE    CC/Focus of Exam: schizoaffective disorder    INTERVAL HISTORY:  The patent states she came to the hospital because her Aristada shot was due. She reports she is feeling good today though a little tired.   Depression rating 3/10  Anxiety rating 5/10  Sleep: good  Withdrawal sx: none reported  Cravin/10 for meth    Review of Systems   Constitutional: Negative.    Respiratory: Negative.    Cardiovascular: Negative.    Gastrointestinal: Negative.    Psychiatric/Behavioral: The patient is nervous/anxious.        OBJECTIVE    Temp:  [97.7 °F (36.5 °C)] 97.7 °F (36.5 °C)  Heart Rate:  [] 103  Resp:  [18-20] 18  BP: (100-111)/(62-70) 104/62    MENTAL STATUS EXAM:  Appearance:Casually dressed, good hygeine.   Cooperation:Cooperative  Psychomotor: No psychomotor agitation/retardation, No EPS, No motor tics  Speech-normal rate, amount.  Mood \"anxious\"   Affect-congruent, appropriate, stable  Thought Content-goal directed, no delusional material present  Thought process-linear, organized.  Suicidality: No SI  Homicidality: No HI  Perception: No AH/VH  Insight-fair   Judgement-fair    Lab Results (last 24 hours)     ** No results found for the last 24 hours. **             Imaging Results (Last 24 Hours)     ** No results found for the last 24 hours. **             ECG/EMG Results (most recent)     Procedure Component Value Units Date/Time    ECG 12 Lead Other [[id number]] Collected: 10/30/22 2115     Updated: 10/30/22 2118     QT Interval 352 ms      QTC Interval 447 ms     Narrative:      Test Reason : Baseline Cardiac Status~  Blood Pressure :   */*   mmHG  Vent. Rate :  97 BPM     Atrial Rate :  97 BPM     P-R Int : 158 ms          QRS Dur :  72 ms      QT Int : 352 ms       P-R-T Axes :  43  37  27 degrees     QTc Int : 447 ms    Normal sinus " rhythm  Anteroseptal infarct , age undetermined  Abnormal ECG  No previous ECGs available    Referred By:            Confirmed By:            ALLERGIES: Patient has no known allergies.    Medication Review:   Scheduled Medications:  buprenorphine-naloxone, 2 tablet, Sublingual, Daily  busPIRone, 15 mg, Oral, BID  cholecalciferol, 1,000 Units, Oral, Daily  FLUoxetine, 40 mg, Oral, Daily  multivitamin, 1 tablet, Oral, Daily  nicotine, 1 patch, Transdermal, Q24H         PRN Medications:  •  acetaminophen  •  aluminum-magnesium hydroxide-simethicone  •  benzonatate  •  benztropine **OR** benztropine  •  diphenhydrAMINE  •  diphenhydrAMINE  •  famotidine  •  haloperidol  •  haloperidol lactate  •  hydrOXYzine  •  ibuprofen  •  loperamide  •  LORazepam  •  LORazepam  •  magnesium hydroxide  •  ondansetron  •  sodium chloride  •  traZODone   All medications reviewed.    ASSESSMENT & PLAN:    Schizoaffective disorder, bipolar type  -Patient reports previous success on Aristada.  We will look into most recent administration and likely continue  -Continue fluoxetine 40 mg daily  -We will establish outpatient psychiatric care following hospitalization     Generalized anxiety disorder  -Medication as above  -Continue buspirone 15 mg twice daily  -Outpatient care as above     Opioid use disorder, severe, dependence, on maintenance therapy  -Admission UDS positive  -Patient not yet received Sublocade ordered at rehab.  Continue Suboxone 16 mg daily  -Ascertain substance abuse treatment plans following discharge     Methamphetamine use disorder, severe, dependence  -Admission UDS positive  -Supportive care  -Ascertain substance abuse treatment plans following discharge     Cannabis use disorder, severe, dependence  -Admission UDS positive  -Supportive care  -Ascertain substance abuse treatment plans following discharge    Special precautions: Special Precautions Level 3 (q15 min checks) .    Behavioral Health Treatment Plan and  Problem List: I have reviewed and approved the Behavioral Health Treatment Plan and Problem list.  The patient has had a chance to review and agrees with the treatment plan.     Clinician:  Arsen Luna MD  11/01/22  13:55 EDT

## 2022-11-01 NOTE — PLAN OF CARE
Goal Outcome Evaluation:  Plan of Care Reviewed With: patient  Patient Agreement with Plan of Care: agrees     Progress: improving  Outcome Evaluation: Pt was calm and cooperative and slept for the majority of the shift and had no other issues or concerns.

## 2022-11-02 PROCEDURE — 99232 SBSQ HOSP IP/OBS MODERATE 35: CPT | Performed by: PSYCHIATRY & NEUROLOGY

## 2022-11-02 RX ORDER — ARIPIPRAZOLE 10 MG/1
10 TABLET ORAL DAILY
Status: DISCONTINUED | OUTPATIENT
Start: 2022-11-02 | End: 2022-11-03 | Stop reason: HOSPADM

## 2022-11-02 RX ADMIN — LORAZEPAM 2 MG: 2 TABLET ORAL at 10:28

## 2022-11-02 RX ADMIN — CHOLECALCIFEROL TAB 10 MCG (400 UNIT) 1000 UNITS: 10 TAB at 08:16

## 2022-11-02 RX ADMIN — LORAZEPAM 2 MG: 2 TABLET ORAL at 20:02

## 2022-11-02 RX ADMIN — ARIPIPRAZOLE 10 MG: 10 TABLET ORAL at 11:34

## 2022-11-02 RX ADMIN — Medication 1 TABLET: at 08:16

## 2022-11-02 RX ADMIN — BUSPIRONE HYDROCHLORIDE 15 MG: 10 TABLET ORAL at 08:16

## 2022-11-02 RX ADMIN — FLUOXETINE HYDROCHLORIDE 40 MG: 20 CAPSULE ORAL at 08:16

## 2022-11-02 RX ADMIN — BUSPIRONE HYDROCHLORIDE 15 MG: 10 TABLET ORAL at 20:02

## 2022-11-02 RX ADMIN — NICOTINE TRANSDERMAL SYSTEM 1 PATCH: 21 PATCH, EXTENDED RELEASE TRANSDERMAL at 08:17

## 2022-11-02 RX ADMIN — BUPRENORPHINE HYDROCHLORIDE AND NALOXONE HYDROCHLORIDE DIHYDRATE 2 TABLET: 8; 2 TABLET SUBLINGUAL at 08:16

## 2022-11-02 NOTE — PLAN OF CARE
Problem: Adult Behavioral Health Plan of Care  Goal: Plan of Care Review  Outcome: Ongoing, Progressing  Flowsheets  Taken 11/1/2022 2112  Progress: no change  Plan of Care Reviewed With: patient  Patient Agreement with Plan of Care: agrees  Outcome Evaluation:   calm and cooperative   rates anxiety 6   depression 10   hopeless, helpless, worthless   reports SI thoughts with no intention or plan to act on   no acute distress noted  Taken 11/1/2022 2045  Plan of Care Reviewed With: patient  Patient Agreement with Plan of Care: agrees  Goal: Patient-Specific Goal (Individualization)  Outcome: Ongoing, Progressing  Goal: Adheres to Safety Considerations for Self and Others  Outcome: Ongoing, Progressing  Intervention: Develop and Maintain Individualized Safety Plan  Recent Flowsheet Documentation  Taken 11/1/2022 2000 by Grace Silveira RN  Safety Measures:   clinical history reviewed   environmental rounds completed   safety plan mutually developed   safety plan reviewed   safety rounds completed   suicide assessment completed  Goal: Absence of New-Onset Illness or Injury  Outcome: Ongoing, Progressing  Intervention: Identify and Manage Fall Risk  Recent Flowsheet Documentation  Taken 11/1/2022 2000 by Grace Silveira, RN  Safety Measures:   clinical history reviewed   environmental rounds completed   safety plan mutually developed   safety plan reviewed   safety rounds completed   suicide assessment completed  Goal: Optimized Coping Skills in Response to Life Stressors  Outcome: Ongoing, Progressing  Goal: Develops/Participates in Therapeutic [place] to Support Successful Transition  Outcome: Ongoing, Progressing   Goal Outcome Evaluation:  Plan of Care Reviewed With: patient  Patient Agreement with Plan of Care: agrees     Progress: no change  Outcome Evaluation: calm and cooperative; rates anxiety 6; depression 10; hopeless, helpless, worthless; reports SI thoughts with no intention or plan to  act on; no acute distress noted

## 2022-11-02 NOTE — PROGRESS NOTES
"INPATIENT PSYCHIATRIC PROGRESS NOTE    Name:  Linda Montgomery  :  1990  MRN:  [id number]  Visit Number:  [id number]  Length of stay:  3    SUBJECTIVE    CC/Focus of Exam: schizoaffective disorder    INTERVAL HISTORY:  The patent reports no worsening of symptoms. Will administer Aristada and plan for dc to rehab tomorrow.     Depression rating 3/10  Anxiety rating 4/10  Sleep: good  Withdrawal sx: none reported  Cravin/10 for meth    Review of Systems   Constitutional: Negative.    Respiratory: Negative.    Cardiovascular: Negative.    Gastrointestinal: Negative.    Psychiatric/Behavioral: The patient is nervous/anxious.        OBJECTIVE    Temp:  [97.4 °F (36.3 °C)-97.9 °F (36.6 °C)] 97.9 °F (36.6 °C)  Heart Rate:  [] 103  Resp:  [18] 18  BP: ()/(67-75) 112/67    MENTAL STATUS EXAM:  Appearance:Casually dressed, good hygeine.   Cooperation:Cooperative  Psychomotor: No psychomotor agitation/retardation, No EPS, No motor tics  Speech-normal rate, amount.  Mood \"anxious\"   Affect-congruent, appropriate, stable  Thought Content-goal directed, no delusional material present  Thought process-linear, organized.  Suicidality: No SI  Homicidality: No HI  Perception: No AH/VH  Insight-fair   Judgement-fair    Lab Results (last 24 hours)     ** No results found for the last 24 hours. **             Imaging Results (Last 24 Hours)     ** No results found for the last 24 hours. **             ECG/EMG Results (most recent)     Procedure Component Value Units Date/Time    ECG 12 Lead Other [[id number]] Collected: 10/30/22 2115     Updated: 22     QT Interval 352 ms      QTC Interval 447 ms     Narrative:      Test Reason : Baseline Cardiac Status~  Blood Pressure :   */*   mmHG  Vent. Rate :  97 BPM     Atrial Rate :  97 BPM     P-R Int : 158 ms          QRS Dur :  72 ms      QT Int : 352 ms       P-R-T Axes :  43  37  27 degrees     QTc Int : 447 ms    Normal sinus rhythm  Anteroseptal " infarct , age undetermined  Abnormal ECG  No previous ECGs available  Confirmed by Tessy Benitez ([id number]) on 11/1/2022 5:35:56 PM    Referred By:            Confirmed By: Tessy Benitez           ALLERGIES: Patient has no known allergies.    Medication Review:   Scheduled Medications:  ARIPiprazole, 10 mg, Oral, Daily  buprenorphine-naloxone, 2 tablet, Sublingual, Daily  busPIRone, 15 mg, Oral, BID  cholecalciferol, 1,000 Units, Oral, Daily  FLUoxetine, 40 mg, Oral, Daily  multivitamin, 1 tablet, Oral, Daily  nicotine, 1 patch, Transdermal, Q24H         PRN Medications:  •  acetaminophen  •  aluminum-magnesium hydroxide-simethicone  •  benzonatate  •  benztropine **OR** benztropine  •  diphenhydrAMINE  •  diphenhydrAMINE  •  famotidine  •  haloperidol  •  haloperidol lactate  •  hydrOXYzine  •  ibuprofen  •  loperamide  •  LORazepam  •  LORazepam  •  magnesium hydroxide  •  ondansetron  •  sodium chloride  •  traZODone   All medications reviewed.    ASSESSMENT & PLAN:    Schizoaffective disorder, bipolar type  -Continue aripiprazole 10mg daily. Plan for one-day Aristada 1064mg initiation tomorrow & dc to rehab  -Continue fluoxetine 40 mg daily  -We will establish outpatient psychiatric care following hospitalization     Generalized anxiety disorder  -Medication as above  -Continue buspirone 15 mg twice daily  -Outpatient care as above     Opioid use disorder, severe, dependence, on maintenance therapy  -Admission UDS positive  -Patient not yet received Sublocade ordered at rehab.  Continue Suboxone 16 mg daily  -Ascertain substance abuse treatment plans following discharge     Methamphetamine use disorder, severe, dependence  -Admission UDS positive  -Supportive care  -Ascertain substance abuse treatment plans following discharge     Cannabis use disorder, severe, dependence  -Admission UDS positive  -Supportive care  -Ascertain substance abuse treatment plans following discharge    Special precautions: Special  Precautions Level 3 (q15 min checks) .    Behavioral Health Treatment Plan and Problem List: I have reviewed and approved the Behavioral Health Treatment Plan and Problem list.  The patient has had a chance to review and agrees with the treatment plan.     Clinician:  Chris Lee MD  11/02/22  14:02 EDT

## 2022-11-02 NOTE — PLAN OF CARE
Goal Outcome Evaluation:   Progress: improving  Outcome Evaluation: Therapist met with patient to review plan of care; patient agreeable.       DATA:      Therapist discussed case with Dr. Lee and met with patient today to review coping skills, review plan of care, and discuss discharge.     Clinical Maneuvering/Intervention:     Therapist assisted patient in processing above session content; acknowledged and normalized patient’s thoughts, feelings, and concerns.  Discussed the therapist/patient relationship and explain the parameters and limitations of relative confidentiality.  Also discussed the importance of active participation, and honesty to the treatment process.  Encouraged the patient to discuss/vent their feelings, frustrations, and fears concerning their ongoing medical issues and validated their feelings.     Allowed patient to freely discuss issues without interruption or judgment. Provided safe, confidential environment to facilitate the development of positive therapeutic relationship and encourage open, honest communication.      Therapist addressed discharge safety planning this date. Assisted patient in identifying risk factors which would indicate the need for higher level of care after discharge;  including thoughts to harm self or others and/or self-harming behavior. Encouraged patient to call 911, or present to the nearest emergency room should any of these events occur. Discussed crisis intervention services and means to access.  Encouraged securing any objects of harm.    Therapist completed integrated summary, treatment plan, and initiated social history this date.  Therapist is strongly encouraging family involvement in treatment.       Encouraged mask wearing, social distancing, and regular hand washing due to COVID19 risk.      ASSESSMENT:      Therapist met 1:1 with patient today. Patient denies suicidal ideation. Patient denies homicidal ideation. Patient denies AVH. Patient is calm  and cooperative. Symptoms of psychosis appear much improved. She reports feeling ready to return to rehab tomorrow. Reports feeling hopeful for the future. Patient concerned about relapsing after leaving treatment. Therapist provided her with the name and number of a local sober living she can contact at complete of her rehab.     PLAN:       Patient to remain hospitalized this date.      Treatment team will focus efforts on stabilizing patient's acute symptoms while providing education on healthy coping and crisis management to reduce hospitalizations.   Patient requires daily psychiatrist evaluation and 24/7 nursing supervision to promote patient  safety.     Therapist will offer 1-4 individual sessions, 1 therapy group daily, family education, and appropriate referral.

## 2022-11-02 NOTE — PROGRESS NOTES
Navigator spoke with Dena at [facility]. They will plan to pick patient up tomorrow after lunch.     [facility] - [phone]

## 2022-11-02 NOTE — PLAN OF CARE
Goal Outcome Evaluation:  Plan of Care Reviewed With: patient  Patient Agreement with Plan of Care: agrees     Progress: improving  Outcome Evaluation: Patient withdrawn, displaying sleepiness. Paitent denies suicidal or homicidal ideation

## 2022-11-03 ENCOUNTER — DISEASE STATE MANAGEMENT VISIT (OUTPATIENT)
Dept: PHARMACY | Facility: HOSPITAL | Age: 32
End: 2022-11-03

## 2022-11-03 VITALS
HEIGHT: 63 IN | SYSTOLIC BLOOD PRESSURE: 119 MMHG | TEMPERATURE: 98.1 F | RESPIRATION RATE: 18 BRPM | HEART RATE: 101 BPM | DIASTOLIC BLOOD PRESSURE: 75 MMHG | BODY MASS INDEX: 33.31 KG/M2 | WEIGHT: 188 LBS | OXYGEN SATURATION: 95 %

## 2022-11-03 PROBLEM — F12.20 CANNABIS USE DISORDER, SEVERE, DEPENDENCE: Status: ACTIVE | Noted: 2022-11-03

## 2022-11-03 PROBLEM — F11.20 OPIOID USE DISORDER, SEVERE, DEPENDENCE: Status: ACTIVE | Noted: 2022-11-03

## 2022-11-03 PROBLEM — F25.0 SCHIZOAFFECTIVE DISORDER, BIPOLAR TYPE: Status: ACTIVE | Noted: 2022-10-30

## 2022-11-03 PROBLEM — F15.20 METHAMPHETAMINE USE DISORDER, SEVERE, DEPENDENCE: Status: ACTIVE | Noted: 2022-11-03

## 2022-11-03 PROCEDURE — 99239 HOSP IP/OBS DSCHRG MGMT >30: CPT | Performed by: PSYCHIATRY & NEUROLOGY

## 2022-11-03 RX ORDER — BUSPIRONE HYDROCHLORIDE 15 MG/1
15 TABLET ORAL EVERY 8 HOURS SCHEDULED
Qty: 90 TABLET | Refills: 0 | Status: SHIPPED | OUTPATIENT
Start: 2022-11-03 | End: 2022-11-03 | Stop reason: SDUPTHER

## 2022-11-03 RX ORDER — ARIPIPRAZOLE 20 MG/1
20 TABLET ORAL ONCE
Qty: 1 TABLET | Refills: 0 | Status: SHIPPED | OUTPATIENT
Start: 2022-11-03 | End: 2022-11-03

## 2022-11-03 RX ORDER — FLUOXETINE HYDROCHLORIDE 40 MG/1
40 CAPSULE ORAL DAILY
Qty: 30 CAPSULE | Refills: 0 | Status: SHIPPED | OUTPATIENT
Start: 2022-11-03

## 2022-11-03 RX ORDER — ARIPIPRAZOLE LAUROXIL 1064 MG/3.9ML
1064 INJECTION, SUSPENSION, EXTENDED RELEASE INTRAMUSCULAR
Qty: 3.9 ML | Refills: 0 | Status: SHIPPED | OUTPATIENT
Start: 2022-11-03

## 2022-11-03 RX ORDER — BUSPIRONE HYDROCHLORIDE 15 MG/1
15 TABLET ORAL EVERY 8 HOURS SCHEDULED
Qty: 90 TABLET | Refills: 0 | Status: SHIPPED | OUTPATIENT
Start: 2022-11-03

## 2022-11-03 RX ORDER — ARIPIPRAZOLE 10 MG/1
20 TABLET ORAL ONCE
Status: COMPLETED | OUTPATIENT
Start: 2022-11-03 | End: 2022-11-03

## 2022-11-03 RX ORDER — BUPRENORPHINE HYDROCHLORIDE AND NALOXONE HYDROCHLORIDE DIHYDRATE 8; 2 MG/1; MG/1
2 TABLET SUBLINGUAL DAILY
Qty: 2 TABLET | Refills: 0 | Status: SHIPPED | OUTPATIENT
Start: 2022-11-04 | End: 2022-11-06

## 2022-11-03 RX ORDER — BUPRENORPHINE HYDROCHLORIDE AND NALOXONE HYDROCHLORIDE DIHYDRATE 8; 2 MG/1; MG/1
2 TABLET SUBLINGUAL DAILY
Qty: 2 TABLET | Refills: 0 | Status: SHIPPED | OUTPATIENT
Start: 2022-11-04 | End: 2022-11-03 | Stop reason: SDUPTHER

## 2022-11-03 RX ORDER — FLUOXETINE HYDROCHLORIDE 40 MG/1
40 CAPSULE ORAL DAILY
Qty: 30 CAPSULE | Refills: 0 | Status: SHIPPED | OUTPATIENT
Start: 2022-11-03 | End: 2022-11-03 | Stop reason: SDUPTHER

## 2022-11-03 RX ORDER — ARIPIPRAZOLE LAUROXIL 675 MG/2.4ML
675 INJECTION, SUSPENSION, EXTENDED RELEASE INTRAMUSCULAR ONCE
Qty: 2.4 ML | Refills: 0 | Status: SHIPPED | OUTPATIENT
Start: 2022-11-03 | End: 2022-11-04

## 2022-11-03 RX ADMIN — LORAZEPAM 2 MG: 2 TABLET ORAL at 08:30

## 2022-11-03 RX ADMIN — FLUOXETINE HYDROCHLORIDE 40 MG: 20 CAPSULE ORAL at 08:30

## 2022-11-03 RX ADMIN — BUSPIRONE HYDROCHLORIDE 15 MG: 10 TABLET ORAL at 14:16

## 2022-11-03 RX ADMIN — ARIPIPRAZOLE 20 MG: 10 TABLET ORAL at 10:55

## 2022-11-03 RX ADMIN — CHOLECALCIFEROL TAB 10 MCG (400 UNIT) 1000 UNITS: 10 TAB at 08:30

## 2022-11-03 RX ADMIN — BUPRENORPHINE HYDROCHLORIDE AND NALOXONE HYDROCHLORIDE DIHYDRATE 2 TABLET: 8; 2 TABLET SUBLINGUAL at 08:30

## 2022-11-03 RX ADMIN — Medication 1 TABLET: at 08:30

## 2022-11-03 RX ADMIN — ARIPIPRAZOLE 10 MG: 10 TABLET ORAL at 08:30

## 2022-11-03 RX ADMIN — BUSPIRONE HYDROCHLORIDE 15 MG: 10 TABLET ORAL at 08:30

## 2022-11-03 RX ADMIN — NICOTINE TRANSDERMAL SYSTEM 1 PATCH: 21 PATCH, EXTENDED RELEASE TRANSDERMAL at 08:30

## 2022-11-03 NOTE — PROGRESS NOTES
Pharmacy Note  Medication: aripiprazole lauroxil (Aristada)    Dose: 1064 mg  Provider: Chris Alaniz Injection date: 11/3/2022  Last Injection: n/a    PA status: not required    Past Medical History:   Diagnosis Date   • Depression    • PTSD (post-traumatic stress disorder)    • Schizoaffective disorder (HCC)    • Substance abuse (HCC)    • Suicide attempt (HCC)      Social History     Socioeconomic History   • Marital status: Unknown   Tobacco Use   • Smoking status: Every Day     Packs/day: 0.50     Types: Cigarettes   • Smokeless tobacco: Never   Vaping Use   • Vaping Use: Never used   Substance and Sexual Activity   • Alcohol use: Never   • Drug use: Not Currently     Types: Heroin, Methamphetamines   • Sexual activity: Not Currently     Partners: Female     No Known Allergies  No current facility-administered medications for this visit.     Current Outpatient Medications   Medication Sig Dispense Refill   • ARIPiprazole Lauroxil ER (Aristada Initio) 675 MG/2.4ML intramuscular injection Inject 2.4 mL into the appropriate muscle as directed by prescriber 1 (One) Time for 1 dose. Indications: Schizophrenia 2.4 mL 0   • ARIPiprazole Lauroxil ER (Aristada) 1064 MG/3.9ML intramuscular injection Inject 3.9 mL into the appropriate muscle as directed by prescriber Every 2 (Two) Months. Indications: Schizophrenia 3.9 mL 0   • [START ON 11/4/2022] buprenorphine-naloxone (SUBOXONE) 8-2 MG per SL tablet Place 2 tablets under the tongue Daily. Indications: Opioid Use Disorder (Continuation of Therapy) 2 tablet 0   • busPIRone (BUSPAR) 15 MG tablet Take 1 tablet by mouth Every 8 (Eight) Hours. Indications: Anxiety Disorder 90 tablet 0   • FLUoxetine (PROzac) 40 MG capsule Take 1 capsule by mouth Daily. Indications: Generalized Anxiety Disorder 30 capsule 0     Facility-Administered Medications Ordered in Other Visits   Medication Dose Route Frequency Provider Last Rate Last Admin   • acetaminophen (TYLENOL) tablet 650  mg  650 mg Oral Q6H PRN Uriel Chery MD       • aluminum-magnesium hydroxide-simethicone (MAALOX MAX) 400-400-40 MG/5ML suspension 15 mL  15 mL Oral Q6H PRN Uriel Chery MD       • ARIPiprazole (ABILIFY) tablet 10 mg  10 mg Oral Daily Chris Lee MD   10 mg at 11/03/22 0830   • benzonatate (TESSALON) capsule 100 mg  100 mg Oral TID PRN Uriel Chery MD       • benztropine (COGENTIN) tablet 2 mg  2 mg Oral Once PRN Uriel Chery MD        Or   • benztropine (COGENTIN) injection 1 mg  1 mg Intramuscular Once PRN Uriel Chery MD       • buprenorphine-naloxone (SUBOXONE) 8-2 MG per SL tablet 2 tablet  2 tablet Sublingual Daily Chris Lee MD   2 tablet at 11/03/22 0830   • busPIRone (BUSPAR) tablet 15 mg  15 mg Oral Q8H Chris Lee MD   15 mg at 11/03/22 1416   • cholecalciferol (VITAMIN D3) tablet 1,000 Units  1,000 Units Oral Daily Chris Lee MD   1,000 Units at 11/03/22 0830   • diphenhydrAMINE (BENADRYL) capsule 50 mg  50 mg Oral Q6H PRN Uriel Chery MD       • diphenhydrAMINE (BENADRYL) injection 50 mg  50 mg Intramuscular Q6H PRN Uriel Chery MD       • famotidine (PEPCID) tablet 20 mg  20 mg Oral BID PRN Uriel Chery MD       • FLUoxetine (PROzac) capsule 40 mg  40 mg Oral Daily Chris Lee MD   40 mg at 11/03/22 0830   • haloperidol (HALDOL) tablet 5 mg  5 mg Oral Q6H PRN Uriel Chery MD       • haloperidol lactate (HALDOL) injection 5 mg  5 mg Intramuscular Q6H PRN Uriel Chery MD       • hydrOXYzine (ATARAX) tablet 50 mg  50 mg Oral Q6H PRN Uriel Chery MD   50 mg at 11/01/22 1045   • ibuprofen (ADVIL,MOTRIN) tablet 400 mg  400 mg Oral Q6H PRN Uriel Chery MD       • loperamide (IMODIUM) capsule 2 mg  2 mg Oral Q2H PRN Uriel Chery MD       • LORazepam (ATIVAN) injection 2 mg  2 mg Intramuscular Q6H PRN Uriel Chery MD       • LORazepam (ATIVAN) tablet 2 mg  2 mg Oral Q6H PRN Uriel Chery MD   2 mg at 11/03/22 0830   • magnesium hydroxide (MILK OF MAGNESIA)  suspension 10 mL  10 mL Oral Daily PRN Uriel Chery MD   10 mL at 11/01/22 1209   • multivitamin (THERAGRAN) tablet 1 tablet  1 tablet Oral Daily Chris Lee MD   1 tablet at 11/03/22 0830   • nicotine (NICODERM CQ) 21 MG/24HR patch 1 patch  1 patch Transdermal Q24H Uriel Chery MD   1 patch at 11/03/22 0830   • ondansetron (ZOFRAN) tablet 4 mg  4 mg Oral Q6H PRN Uriel Chery MD   4 mg at 11/01/22 1209   • sodium chloride nasal spray 2 spray  2 spray Each Nare PRN Uriel Chery MD       • traZODone (DESYREL) tablet 50 mg  50 mg Oral Nightly PRN Uriel Chery MD           Labs  WBC   Date Value Ref Range Status   10/30/2022 8.25 3.40 - 10.80 10*3/mm3 Final     RBC   Date Value Ref Range Status   10/30/2022 4.55 3.77 - 5.28 10*6/mm3 Final     Hemoglobin   Date Value Ref Range Status   10/30/2022 13.4 12.0 - 15.9 g/dL Final     Hematocrit   Date Value Ref Range Status   10/30/2022 42.6 34.0 - 46.6 % Final     MCV   Date Value Ref Range Status   10/30/2022 93.6 79.0 - 97.0 fL Final     MCH   Date Value Ref Range Status   10/30/2022 29.5 26.6 - 33.0 pg Final     MCHC   Date Value Ref Range Status   10/30/2022 31.5 31.5 - 35.7 g/dL Final     RDW   Date Value Ref Range Status   10/30/2022 12.2 (L) 12.3 - 15.4 % Final     RDW-SD   Date Value Ref Range Status   10/30/2022 42.3 37.0 - 54.0 fl Final     MPV   Date Value Ref Range Status   10/30/2022 9.9 6.0 - 12.0 fL Final     Platelets   Date Value Ref Range Status   10/30/2022 239 140 - 450 10*3/mm3 Final     Neutrophil %   Date Value Ref Range Status   10/30/2022 62.0 42.7 - 76.0 % Final     Lymphocyte %   Date Value Ref Range Status   10/30/2022 30.2 19.6 - 45.3 % Final     Monocyte %   Date Value Ref Range Status   10/30/2022 5.3 5.0 - 12.0 % Final     Eosinophil %   Date Value Ref Range Status   10/30/2022 1.0 0.3 - 6.2 % Final     Basophil %   Date Value Ref Range Status   10/30/2022 0.4 0.0 - 1.5 % Final     Immature Grans %   Date Value Ref Range Status    10/30/2022 1.1 (H) 0.0 - 0.5 % Final     Neutrophils, Absolute   Date Value Ref Range Status   10/30/2022 5.12 1.70 - 7.00 10*3/mm3 Final     Lymphocytes, Absolute   Date Value Ref Range Status   10/30/2022 2.49 0.70 - 3.10 10*3/mm3 Final     Monocytes, Absolute   Date Value Ref Range Status   10/30/2022 0.44 0.10 - 0.90 10*3/mm3 Final     Eosinophils, Absolute   Date Value Ref Range Status   10/30/2022 0.08 0.00 - 0.40 10*3/mm3 Final     Basophils, Absolute   Date Value Ref Range Status   10/30/2022 0.03 0.00 - 0.20 10*3/mm3 Final     Immature Grans, Absolute   Date Value Ref Range Status   10/30/2022 0.09 (H) 0.00 - 0.05 10*3/mm3 Final     nRBC   Date Value Ref Range Status   10/30/2022 0.0 0.0 - 0.2 /100 WBC Final           Lab 10/30/22  1545   SODIUM 139   POTASSIUM 3.5   CHLORIDE 102   CO2 22.6   ANION GAP 14.4   BUN 8   CREATININE 0.69   EGFR 120.7   GLUCOSE 92   CALCIUM 9.2   MAGNESIUM 1.9   TOTAL PROTEIN 7.4   ALBUMIN 4.30   GLOBULIN 3.1   ALT (SGPT) 42*   AST (SGOT) 29   BILIRUBIN 0.2   ALK PHOS 81     No results found for: HGBA1C  No results found for: CHOL, CHLPL, TRIG, HDL, LDL, LDLDIRECT      Assessment  Linda Montgomery is a 31 y.o. female that was assessed today for Aristada therapy. Patient had successful trial of Abilify tablets and appropriate to transition to ONOFRE with 1 day initiation. Patient received 30 mg tablet earlier today and appropriate for initio and 1064 mg dose at this time. Patient denies any side effects or issues with the Abilify tablets.         Feelings of depression or suicidal ideation (if yes, does patient have a plan)?  No  Unexplained compulsions? No  Injection site reactions or problems with previous injection? No  EPS? Involuntary Movement? No  History of seizures/ meds that lower seizure threshold?No        Development of galactorrhea? No  Changes in libido? No    For Women:   Pregnancy, breastfeeding, or planning on becoming pregnant within the next 3 months?  No  Changes in menstruation? No    Drug-Drug Interactions:   No significant interactions according to literature.    Plan    Pt will receive Aristada Initio 675 mg IM into the LEFT gluteal muscle and Aristada 1064 mg into the RIGHT gluteal muscle today.  Pt to receive next injection in 8 weeks.     Pt education provided.  The patient denies any questions/issues/concerns with the medication.     Follow-up with provider at [facility] for next injection.     Sherrie Helm PharmD  11/03/22  15:08 EDT

## 2022-11-03 NOTE — PLAN OF CARE
Goal Outcome Evaluation:  Plan of Care Reviewed With: patient  Patient Agreement with Plan of Care: agrees        Outcome Evaluation: Patient reports anxiety at 8 and depression at 6.  Reports auditory hallucinations.  Denies SI/HI.  Patient cooperative this shift.

## 2022-11-03 NOTE — PROGRESS NOTES
On date of discharge, patient is calm and cooperative. Patient denies SI/HI. She reports mild and occasional AH, but reports this is her baseline and it is not distressing. She denies any command hallucinations. Patient reports improvement in mood and symptoms. She is future-oriented, looking forward to returning to rehab. Patient denies any additional needs or concerns today. Therapist educated her on returning to the nearest ED/contacting 911 if she begins to experience SI/HI or worsening AVH; patient is agreeable.

## 2022-11-04 NOTE — DISCHARGE SUMMARY
"      PSYCHIATRIC DISCHARGE SUMMARY     Patient Identification:  Name:  Linda Montgomery  Age:  31 y.o.  Sex:  female  :  1990  MRN:  [id number]  Visit Number:  [id number]    Date of Admission:10/30/2022   Date of Discharge: 11/3/2022     Discharge Diagnosis:  Principal Problem:    Schizoaffective disorder, bipolar type ([facility])  Active Problems:    Methamphetamine use disorder, severe, dependence ([facility])    Opioid use disorder, severe, dependence ([facility])    Cannabis use disorder, severe, dependence ([facility])      Admission Diagnosis:  Paranoia (psychosis) ([facility]) [F22]     Hospital Course  Patient is a 31 y.o. female presented with psychosis, paranoia, SI. Admitted for crisis stabilization. Admission labs with UDS +bup, bzo, THC, TCA. Pt continued on fluoxetine 40mg daiy, buspirone 15mg BID, SUboxone 16mg daily and reinstated on aripiprazole. Pt symptoms improved over the course of her stay. She exhibited no behavior concerning for harm to herself or others. On 11/3/22, patient received one-day Abilify Aristada initiation, including aripiprazole 30mg PO once, Initio 675mg IM once, and Aristada 1064mg IM once. Next Aristada 1064mg dose due within a week of 1/3/23. Pt accepted at [facility], to which she will be discharged today.     On the day of discharge, patient denied SI, HI or AVH. Patient was stable and appropriate by the conclusion of this admission, denying significant symptoms of mood, psychotic or thought disorder. Patient showed improvement of presenting symptoms and was deemed appropriate for discharge today.    Mental Status Exam upon discharge:   Mood \"better\"   Affect-congruent, appropriate, stable  Thought Content-goal directed, no delusional material present  Thought process-linear, organized.  Suicidality: No SI  Homicidality: No HI  Perception: No AH/    Procedures Performed         Consults:   Consults     No orders found from 10/1/2022 to 10/31/2022.          Pertinent Test Results:   Lab " Results (last 7 days)     ** No results found for the last 168 hours. **          Condition on Discharge:  improved    Vital Signs       Discharge Disposition:  Rehab Facility or Unit (DC - External)    Discharge Medications:     Discharge Medications      New Medications      Instructions Start Date   Aristada Initio 675 MG/2.4ML intramuscular injection  Generic drug: ARIPiprazole Lauroxil ER  Notes to patient: Patient received her one time dose before leaving the hospital on 11/3/2022   675 mg, Intramuscular, Once      Aristada 1064 MG/3.9ML intramuscular injection  Generic drug: ARIPiprazole Lauroxil ER  Notes to patient: Patient received dose before leaving hospital on 11/3/2022. Next dose due on or around 12/29/2022   1,064 mg, Intramuscular, Every 2 Months         Changes to Medications      Instructions Start Date   busPIRone 15 MG tablet  Commonly known as: BUSPAR  What changed: when to take this   15 mg, Oral, Every 8 Hours Scheduled         Continue These Medications      Instructions Start Date   buprenorphine-naloxone 8-2 MG per SL tablet  Commonly known as: SUBOXONE   2 tablets, Sublingual, Daily      cholecalciferol 25 MCG (1000 UT) tablet  Commonly known as: VITAMIN D3   1,000 Units, Oral, Daily      FLUoxetine 40 MG capsule  Commonly known as: PROzac   40 mg, Oral, Daily      multivitamin tablet tablet   Oral, Daily             Discharge Diet: Normal  Diet Instructions    Regular            Activity at Discharge: Normal  Activity Instructions    As tolerated           Follow-up Appointments  No future appointments.      Test Results Pending at Discharge  None     Time: I spent  Greater than 30  minutes on this discharge activity which included: face-to-face encounter with the patient, reviewing the data in the system, coordination of the care with the nursing staff as well as consultants, documentation, and entering orders.      Clinician:   Chris Lee MD  11/04/22  10:50 EDT

## 2022-11-06 ENCOUNTER — HOSPITAL ENCOUNTER (INPATIENT)
Facility: HOSPITAL | Age: 32
LOS: 2 days | Discharge: NURSING, REHAB OR LONG-TERM CARE | End: 2022-11-08
Attending: PSYCHIATRY & NEUROLOGY | Admitting: PSYCHIATRY & NEUROLOGY

## 2022-11-06 ENCOUNTER — HOSPITAL ENCOUNTER (EMERGENCY)
Facility: HOSPITAL | Age: 32
Discharge: PSYCHIATRIC OR SUBSTANCE USE FACILITY | End: 2022-11-06
Attending: STUDENT IN AN ORGANIZED HEALTH CARE EDUCATION/TRAINING PROGRAM | Admitting: STUDENT IN AN ORGANIZED HEALTH CARE EDUCATION/TRAINING PROGRAM

## 2022-11-06 VITALS
WEIGHT: 193 LBS | SYSTOLIC BLOOD PRESSURE: 125 MMHG | BODY MASS INDEX: 34.2 KG/M2 | OXYGEN SATURATION: 94 % | HEART RATE: 84 BPM | TEMPERATURE: 98.4 F | HEIGHT: 63 IN | RESPIRATION RATE: 17 BRPM | DIASTOLIC BLOOD PRESSURE: 81 MMHG

## 2022-11-06 DIAGNOSIS — R45.851 SUICIDAL IDEATION: Primary | ICD-10-CM

## 2022-11-06 LAB
ALBUMIN SERPL-MCNC: 3.92 G/DL (ref 3.5–5.2)
ALBUMIN/GLOB SERPL: 1.3 G/DL
ALP SERPL-CCNC: 73 U/L (ref 39–117)
ALT SERPL W P-5'-P-CCNC: 30 U/L (ref 1–33)
AMPHET+METHAMPHET UR QL: POSITIVE
AMPHETAMINES UR QL: POSITIVE
ANION GAP SERPL CALCULATED.3IONS-SCNC: 12 MMOL/L (ref 5–15)
APAP SERPL-MCNC: <5 MCG/ML (ref 0–30)
AST SERPL-CCNC: 24 U/L (ref 1–32)
B-HCG UR QL: NEGATIVE
BARBITURATES UR QL SCN: NEGATIVE
BASOPHILS # BLD AUTO: 0.03 10*3/MM3 (ref 0–0.2)
BASOPHILS NFR BLD AUTO: 0.4 % (ref 0–1.5)
BENZODIAZ UR QL SCN: NEGATIVE
BILIRUB SERPL-MCNC: 0.3 MG/DL (ref 0–1.2)
BILIRUB UR QL STRIP: NEGATIVE
BUN SERPL-MCNC: 8 MG/DL (ref 6–20)
BUN/CREAT SERPL: 12.1 (ref 7–25)
BUPRENORPHINE SERPL-MCNC: POSITIVE NG/ML
CALCIUM SPEC-SCNC: 9 MG/DL (ref 8.6–10.5)
CANNABINOIDS SERPL QL: POSITIVE
CHLORIDE SERPL-SCNC: 102 MMOL/L (ref 98–107)
CLARITY UR: CLEAR
CO2 SERPL-SCNC: 22 MMOL/L (ref 22–29)
COCAINE UR QL: NEGATIVE
COLOR UR: YELLOW
CREAT SERPL-MCNC: 0.66 MG/DL (ref 0.57–1)
DEPRECATED RDW RBC AUTO: 40.8 FL (ref 37–54)
EGFRCR SERPLBLD CKD-EPI 2021: 121.2 ML/MIN/1.73
EOSINOPHIL # BLD AUTO: 0.17 10*3/MM3 (ref 0–0.4)
EOSINOPHIL NFR BLD AUTO: 2.3 % (ref 0.3–6.2)
ERYTHROCYTE [DISTWIDTH] IN BLOOD BY AUTOMATED COUNT: 12.2 % (ref 12.3–15.4)
ETHANOL BLD-MCNC: <10 MG/DL (ref 0–10)
ETHANOL UR QL: <0.01 %
FLUAV RNA RESP QL NAA+PROBE: NOT DETECTED
FLUBV RNA RESP QL NAA+PROBE: NOT DETECTED
GLOBULIN UR ELPH-MCNC: 3.1 GM/DL
GLUCOSE SERPL-MCNC: 112 MG/DL (ref 65–99)
GLUCOSE UR STRIP-MCNC: NEGATIVE MG/DL
HCT VFR BLD AUTO: 38.5 % (ref 34–46.6)
HGB BLD-MCNC: 12.5 G/DL (ref 12–15.9)
HGB UR QL STRIP.AUTO: NEGATIVE
HOLD SPECIMEN: NORMAL
HOLD SPECIMEN: NORMAL
IMM GRANULOCYTES # BLD AUTO: 0.05 10*3/MM3 (ref 0–0.05)
IMM GRANULOCYTES NFR BLD AUTO: 0.7 % (ref 0–0.5)
KETONES UR QL STRIP: NEGATIVE
LEUKOCYTE ESTERASE UR QL STRIP.AUTO: NEGATIVE
LYMPHOCYTES # BLD AUTO: 2.6 10*3/MM3 (ref 0.7–3.1)
LYMPHOCYTES NFR BLD AUTO: 35.2 % (ref 19.6–45.3)
MCH RBC QN AUTO: 30 PG (ref 26.6–33)
MCHC RBC AUTO-ENTMCNC: 32.5 G/DL (ref 31.5–35.7)
MCV RBC AUTO: 92.5 FL (ref 79–97)
METHADONE UR QL SCN: NEGATIVE
MONOCYTES # BLD AUTO: 0.55 10*3/MM3 (ref 0.1–0.9)
MONOCYTES NFR BLD AUTO: 7.5 % (ref 5–12)
NEUTROPHILS NFR BLD AUTO: 3.98 10*3/MM3 (ref 1.7–7)
NEUTROPHILS NFR BLD AUTO: 53.9 % (ref 42.7–76)
NITRITE UR QL STRIP: NEGATIVE
NRBC BLD AUTO-RTO: 0 /100 WBC (ref 0–0.2)
OPIATES UR QL: NEGATIVE
OXYCODONE UR QL SCN: NEGATIVE
PCP UR QL SCN: NEGATIVE
PH UR STRIP.AUTO: 8 [PH] (ref 5–8)
PLATELET # BLD AUTO: 237 10*3/MM3 (ref 140–450)
PMV BLD AUTO: 10.2 FL (ref 6–12)
POTASSIUM SERPL-SCNC: 3.9 MMOL/L (ref 3.5–5.2)
PROPOXYPH UR QL: NEGATIVE
PROT SERPL-MCNC: 7 G/DL (ref 6–8.5)
PROT UR QL STRIP: NEGATIVE
RBC # BLD AUTO: 4.16 10*6/MM3 (ref 3.77–5.28)
SALICYLATES SERPL-MCNC: <0.3 MG/DL
SARS-COV-2 RNA RESP QL NAA+PROBE: NOT DETECTED
SODIUM SERPL-SCNC: 136 MMOL/L (ref 136–145)
SP GR UR STRIP: 1.02 (ref 1–1.03)
TRICYCLICS UR QL SCN: NEGATIVE
UROBILINOGEN UR QL STRIP: NORMAL
WBC NRBC COR # BLD: 7.38 10*3/MM3 (ref 3.4–10.8)
WHOLE BLOOD HOLD SPECIMEN: NORMAL

## 2022-11-06 PROCEDURE — 85025 COMPLETE CBC W/AUTO DIFF WBC: CPT | Performed by: STUDENT IN AN ORGANIZED HEALTH CARE EDUCATION/TRAINING PROGRAM

## 2022-11-06 PROCEDURE — 81003 URINALYSIS AUTO W/O SCOPE: CPT | Performed by: STUDENT IN AN ORGANIZED HEALTH CARE EDUCATION/TRAINING PROGRAM

## 2022-11-06 PROCEDURE — 80306 DRUG TEST PRSMV INSTRMNT: CPT | Performed by: STUDENT IN AN ORGANIZED HEALTH CARE EDUCATION/TRAINING PROGRAM

## 2022-11-06 PROCEDURE — C9803 HOPD COVID-19 SPEC COLLECT: HCPCS

## 2022-11-06 PROCEDURE — 82077 ASSAY SPEC XCP UR&BREATH IA: CPT | Performed by: STUDENT IN AN ORGANIZED HEALTH CARE EDUCATION/TRAINING PROGRAM

## 2022-11-06 PROCEDURE — 93005 ELECTROCARDIOGRAM TRACING: CPT | Performed by: PSYCHIATRY & NEUROLOGY

## 2022-11-06 PROCEDURE — 80179 DRUG ASSAY SALICYLATE: CPT | Performed by: STUDENT IN AN ORGANIZED HEALTH CARE EDUCATION/TRAINING PROGRAM

## 2022-11-06 PROCEDURE — 36415 COLL VENOUS BLD VENIPUNCTURE: CPT

## 2022-11-06 PROCEDURE — 99285 EMERGENCY DEPT VISIT HI MDM: CPT

## 2022-11-06 PROCEDURE — 87636 SARSCOV2 & INF A&B AMP PRB: CPT | Performed by: PSYCHIATRY & NEUROLOGY

## 2022-11-06 PROCEDURE — 80053 COMPREHEN METABOLIC PANEL: CPT | Performed by: STUDENT IN AN ORGANIZED HEALTH CARE EDUCATION/TRAINING PROGRAM

## 2022-11-06 PROCEDURE — 80143 DRUG ASSAY ACETAMINOPHEN: CPT | Performed by: STUDENT IN AN ORGANIZED HEALTH CARE EDUCATION/TRAINING PROGRAM

## 2022-11-06 PROCEDURE — 81025 URINE PREGNANCY TEST: CPT | Performed by: PSYCHIATRY & NEUROLOGY

## 2022-11-06 RX ORDER — ONDANSETRON 4 MG/1
4 TABLET, FILM COATED ORAL EVERY 6 HOURS PRN
Status: DISCONTINUED | OUTPATIENT
Start: 2022-11-06 | End: 2022-11-08 | Stop reason: HOSPADM

## 2022-11-06 RX ORDER — BENZONATATE 100 MG/1
100 CAPSULE ORAL 3 TIMES DAILY PRN
Status: DISCONTINUED | OUTPATIENT
Start: 2022-11-06 | End: 2022-11-08 | Stop reason: HOSPADM

## 2022-11-06 RX ORDER — TRAZODONE HYDROCHLORIDE 50 MG/1
50 TABLET ORAL NIGHTLY PRN
Status: DISCONTINUED | OUTPATIENT
Start: 2022-11-06 | End: 2022-11-08 | Stop reason: HOSPADM

## 2022-11-06 RX ORDER — DICYCLOMINE HYDROCHLORIDE 10 MG/1
10 CAPSULE ORAL 3 TIMES DAILY PRN
Status: DISCONTINUED | OUTPATIENT
Start: 2022-11-06 | End: 2022-11-08 | Stop reason: HOSPADM

## 2022-11-06 RX ORDER — CLONIDINE HYDROCHLORIDE 0.1 MG/1
0.1 TABLET ORAL 2 TIMES DAILY PRN
Status: DISCONTINUED | OUTPATIENT
Start: 2022-11-09 | End: 2022-11-08 | Stop reason: HOSPADM

## 2022-11-06 RX ORDER — CLONIDINE HYDROCHLORIDE 0.1 MG/1
0.1 TABLET ORAL 4 TIMES DAILY PRN
Status: ACTIVE | OUTPATIENT
Start: 2022-11-07 | End: 2022-11-08

## 2022-11-06 RX ORDER — BENZTROPINE MESYLATE 1 MG/1
2 TABLET ORAL ONCE AS NEEDED
Status: DISCONTINUED | OUTPATIENT
Start: 2022-11-06 | End: 2022-11-08 | Stop reason: HOSPADM

## 2022-11-06 RX ORDER — FAMOTIDINE 20 MG/1
20 TABLET, FILM COATED ORAL 2 TIMES DAILY PRN
Status: DISCONTINUED | OUTPATIENT
Start: 2022-11-06 | End: 2022-11-08 | Stop reason: HOSPADM

## 2022-11-06 RX ORDER — IBUPROFEN 400 MG/1
400 TABLET ORAL EVERY 6 HOURS PRN
Status: DISCONTINUED | OUTPATIENT
Start: 2022-11-06 | End: 2022-11-08 | Stop reason: HOSPADM

## 2022-11-06 RX ORDER — BUPRENORPHINE 300 MG/1
300 SOLUTION SUBCUTANEOUS
COMMUNITY

## 2022-11-06 RX ORDER — CLONIDINE HYDROCHLORIDE 0.1 MG/1
0.1 TABLET ORAL 3 TIMES DAILY PRN
Status: DISCONTINUED | OUTPATIENT
Start: 2022-11-08 | End: 2022-11-08 | Stop reason: HOSPADM

## 2022-11-06 RX ORDER — LOPERAMIDE HYDROCHLORIDE 2 MG/1
2 CAPSULE ORAL
Status: DISCONTINUED | OUTPATIENT
Start: 2022-11-06 | End: 2022-11-08 | Stop reason: HOSPADM

## 2022-11-06 RX ORDER — CYCLOBENZAPRINE HCL 10 MG
10 TABLET ORAL 3 TIMES DAILY PRN
Status: DISCONTINUED | OUTPATIENT
Start: 2022-11-06 | End: 2022-11-08 | Stop reason: HOSPADM

## 2022-11-06 RX ORDER — HYDROXYZINE 50 MG/1
50 TABLET, FILM COATED ORAL EVERY 6 HOURS PRN
Status: DISCONTINUED | OUTPATIENT
Start: 2022-11-06 | End: 2022-11-08 | Stop reason: HOSPADM

## 2022-11-06 RX ORDER — LORAZEPAM 1 MG/1
1 TABLET ORAL ONCE
Status: COMPLETED | OUTPATIENT
Start: 2022-11-06 | End: 2022-11-06

## 2022-11-06 RX ORDER — BENZTROPINE MESYLATE 1 MG/ML
1 INJECTION INTRAMUSCULAR; INTRAVENOUS ONCE AS NEEDED
Status: DISCONTINUED | OUTPATIENT
Start: 2022-11-06 | End: 2022-11-08 | Stop reason: HOSPADM

## 2022-11-06 RX ORDER — NICOTINE 21 MG/24HR
1 PATCH, TRANSDERMAL 24 HOURS TRANSDERMAL
Status: DISCONTINUED | OUTPATIENT
Start: 2022-11-07 | End: 2022-11-08 | Stop reason: HOSPADM

## 2022-11-06 RX ORDER — ECHINACEA PURPUREA EXTRACT 125 MG
2 TABLET ORAL AS NEEDED
Status: DISCONTINUED | OUTPATIENT
Start: 2022-11-06 | End: 2022-11-08 | Stop reason: HOSPADM

## 2022-11-06 RX ORDER — ALUMINA, MAGNESIA, AND SIMETHICONE 2400; 2400; 240 MG/30ML; MG/30ML; MG/30ML
15 SUSPENSION ORAL EVERY 6 HOURS PRN
Status: DISCONTINUED | OUTPATIENT
Start: 2022-11-06 | End: 2022-11-08 | Stop reason: HOSPADM

## 2022-11-06 RX ORDER — CLONIDINE HYDROCHLORIDE 0.1 MG/1
0.1 TABLET ORAL ONCE AS NEEDED
Status: DISCONTINUED | OUTPATIENT
Start: 2022-11-10 | End: 2022-11-08 | Stop reason: HOSPADM

## 2022-11-06 RX ORDER — CLONIDINE HYDROCHLORIDE 0.1 MG/1
0.1 TABLET ORAL 4 TIMES DAILY PRN
Status: DISPENSED | OUTPATIENT
Start: 2022-11-06 | End: 2022-11-07

## 2022-11-06 RX ORDER — HYDRALAZINE HYDROCHLORIDE 25 MG/1
25 TABLET, FILM COATED ORAL DAILY PRN
Status: DISCONTINUED | OUTPATIENT
Start: 2022-11-06 | End: 2022-11-08 | Stop reason: HOSPADM

## 2022-11-06 RX ADMIN — LORAZEPAM 1 MG: 1 TABLET ORAL at 21:56

## 2022-11-07 PROCEDURE — 93010 ELECTROCARDIOGRAM REPORT: CPT | Performed by: INTERNAL MEDICINE

## 2022-11-07 PROCEDURE — 99223 1ST HOSP IP/OBS HIGH 75: CPT | Performed by: PSYCHIATRY & NEUROLOGY

## 2022-11-07 RX ORDER — FLUOXETINE HYDROCHLORIDE 20 MG/1
40 CAPSULE ORAL DAILY
Status: DISCONTINUED | OUTPATIENT
Start: 2022-11-07 | End: 2022-11-08 | Stop reason: HOSPADM

## 2022-11-07 RX ADMIN — CYCLOBENZAPRINE 10 MG: 10 TABLET, FILM COATED ORAL at 21:06

## 2022-11-07 RX ADMIN — CLONIDINE HYDROCHLORIDE 0.1 MG: 0.1 TABLET ORAL at 12:50

## 2022-11-07 RX ADMIN — BUSPIRONE HYDROCHLORIDE 15 MG: 10 TABLET ORAL at 06:26

## 2022-11-07 RX ADMIN — IBUPROFEN 400 MG: 400 TABLET, FILM COATED ORAL at 12:50

## 2022-11-07 RX ADMIN — BUSPIRONE HYDROCHLORIDE 15 MG: 10 TABLET ORAL at 15:13

## 2022-11-07 RX ADMIN — BUSPIRONE HYDROCHLORIDE 15 MG: 10 TABLET ORAL at 21:06

## 2022-11-07 RX ADMIN — HYDROXYZINE HYDROCHLORIDE 50 MG: 50 TABLET, FILM COATED ORAL at 15:24

## 2022-11-07 RX ADMIN — HYDROXYZINE HYDROCHLORIDE 50 MG: 50 TABLET, FILM COATED ORAL at 00:38

## 2022-11-07 RX ADMIN — TRAZODONE HYDROCHLORIDE 50 MG: 50 TABLET ORAL at 00:38

## 2022-11-07 RX ADMIN — Medication 1 PATCH: at 08:14

## 2022-11-07 RX ADMIN — HYDROXYZINE HYDROCHLORIDE 50 MG: 50 TABLET, FILM COATED ORAL at 08:14

## 2022-11-07 RX ADMIN — FLUOXETINE HYDROCHLORIDE 40 MG: 20 CAPSULE ORAL at 08:14

## 2022-11-07 RX ADMIN — LOPERAMIDE HYDROCHLORIDE 2 MG: 2 CAPSULE ORAL at 12:50

## 2022-11-07 NOTE — NURSING NOTE
Patient arrived at intake. Searched with 2 staff members present. Patient's belongings placed in safe storage. Patient advised to wear a mask, and to remain 6 feet from others.

## 2022-11-07 NOTE — PLAN OF CARE
Problem: Adult Behavioral Health Plan of Care  Goal: Plan of Care Review  Recent Flowsheet Documentation  Taken 11/7/2022 0028 by Marcela Payton, RN  Plan of Care Reviewed With: patient  Patient Agreement with Plan of Care: agrees   Goal Outcome Evaluation:  Plan of Care Reviewed With: patient  Patient Agreement with Plan of Care: agrees         New admission.  Care plan initiated.    Complex Repair And Graft Additional Text (Will Appearing After The Standard Complex Repair Text): The complex repair was not sufficient to completely close the primary defect. The remaining additional defect was repaired with the graft mentioned below.

## 2022-11-07 NOTE — NURSING NOTE
Spoke to mother/guardian [phone]  Jayde ledesma she gave permission to assess, treat, and admit if pt meets criteria.

## 2022-11-07 NOTE — NURSING NOTE
Reviewed Prozac capsule 40 mg oral daily and Buspar 15 mg oral daily with Yany Trejo [phone], verbalized understanding , granted consent

## 2022-11-07 NOTE — PROGRESS NOTES
0945:     DATA:      Therapist met individually with patient this date to introduce role and to discuss hospitalization expectations. Patient agreeable. Reviewed medical record and staffed case with treatment team this date. No major issues identified.       Clinical Maneuvering/Intervention:     Therapist assisted patient in processing above session content; acknowledged and normalized patient’s thoughts, feelings, and concerns.  Discussed the therapist/patient relationship and explain the parameters and limitations of relative confidentiality.  Also discussed the importance of active participation, and honesty to the treatment process.  Encouraged the patient to discuss/vent their feelings, frustrations, and fears concerning their ongoing medical issues and validated their feelings.    Concern was voiced regarding substance use and educated patient on risks associated with use. Patient was advised to abstain from use and educated on community resources that can help with sobriety and recovery.       Allowed patient to freely discuss issues without interruption or judgment. Provided safe, confidential environment to facilitate the development of positive therapeutic relationship and encourage open, honest communication.      Therapist addressed discharge safety planning this date. Assisted patient in identifying risk factors which would indicate the need for higher level of care after discharge;  including thoughts to harm self or others and/or self-harming behavior. Encouraged patient to call [phone],  [phone], or present to the nearest emergency room should any of these events occur. Discussed crisis intervention services and means to access.  Encouraged securing any objects of harm.       Therapist completed integrated summary, treatment plan, and initiated social history this date.  Therapist is strongly encouraging family involvement in treatment.       ASSESSMENT:      The patient is a 32 year old  female.  "Per report, \" Pt assessment complete pt came to [facility] seeking mental health evaluation. Pt reports for the last 2 days she has had worsening hallucinations of seeing people, and and hearing voices telling her to overdose to kill herself, or to hurt other people. Pt rates dep 10 anx 10 Pt reports hx of substance abuse with her last use being \"a couple months ago.\"     Today, patient was seen 1-1 in the office. Patient calm/cooperative and oriented x 4.  Patient calm and cooperative and reports that she doesn't really need to be here. She relates that she was hearing voices, but she knows that she just needs to give her medicine time. She identifies that she was positive for Meth, but denies using. Patient reports that she wants to go back to rehab and just redirect the voices. She denies SI/HI and depression/anxiety.  Chart indicates mother Is guardian. However, no guardianship papers on file and there appears to be paper works with face sheet indicating that mother Jayde Trejo is emergency contact. Patient decliners family involvement this date.       Pt reports meth 1-2 times weekly intranasally, and iv.      Heroin Iv $500 dollars daily   Pt reports that she tried to overdose 2 months ago on pills     Goals for treatment:  \"I was hearing voices, but don't need to be here.\"     Prior Hospitalizations / Dates/current treatment:  [facility] x 2.        Childhood History:  Born and raised in [place]. Raised by whole family.  Reports good upbringing. Got into drugs, they disowned.      Suicide Attempts:  2021; cut wrist      Alcohol:  Denies     Substance use: Heroin, crack and Meth.  Reports last used all 2 months ago.  However, UDS positive for THC and Methamphetamine. Reports longest time of sobriety 6 years.     Legal:  Denies     Relationship/support:  Single.  No children.     Spiritual:  Believes in God      Education: GED.  Last job [employer] 2022.         Access to firearms: Denies         PLAN:     "   Patient to remain hospitalized this date.      Treatment team will focus efforts on stabilizing patient's acute symptoms while providing education on healthy coping and crisis management to reduce hospitalizations.   Patient requires daily psychiatrist evaluation and 24/7 nursing supervision to promote patient  safety.     Therapist will offer 1-4 individual sessions, family education, and appropriate referral.     Therapist recommends residential [facility] compliance. Patient signed consent for [facility].

## 2022-11-07 NOTE — PROGRESS NOTES
"1130:     Therapist met with patient again to discuss information on the chart indicating that her mother is her guardian. Patient responded, \"Oh yea she is my guardian. Of course you can involve her.\"      Therapist contacted mother Jayde Trejo at [phone]. She confirms that she is patient's guardian and gives consent to involve [facility] and for patient to return there at discharge.  Jayde appears supportive of patient. She reports that we can contact [employer] for copy of guardianship or she can fax copy tomorrow. No other issues identified this date.   "

## 2022-11-07 NOTE — PROGRESS NOTES
Navigator is helping Primary Therapist with discharge planning for patient. Navigator completed the following referrals on this day:    [facility] - [phone]  -Spoke with [facility]. Patient can return to their facility when ready for discharge. Treatment team to call when discharge day is known.  Dena states they do not have a copy of guardianship papers.   11/7  -Received call from Dena. She states she just found out that they will not hold a bed for the patient. Patient will need to call and speak with intake staff about returning.  11/7  -Received another call from Dena. They are now willing to accept patient back at discharge. Treatment team to call the day of and they will provide transportation.  11/7

## 2022-11-07 NOTE — DISCHARGE INSTR - APPOINTMENTS
[facility] [place] [address], [place] [address] [phone]    11/8/22 at 1:00 p.m.

## 2022-11-07 NOTE — NURSING NOTE
"Pt assessment complete pt came to [facility] seeking mental health evaluation.     Pt reports for the last 2 days she has had worsening hallucinations of seeing people, and and hearing voices telling her to overdose to kill herself, or to hurt other people.     Pt rates dep 10   anx 10   Pt reports hx of substance abuse with her last use being \"a couple months ago.\"     Pt reports meth 1-2 times weekly intranasally, and iv.     Heroin Iv $500 dollars daily   Pt reports that she tried to overdose 2 months ago on pills.   "

## 2022-11-07 NOTE — NURSING NOTE
Presented pt to DR Chery new order to admit to [facility] sp[facility] routine orders   Clonidine detox   [id number]

## 2022-11-07 NOTE — PLAN OF CARE
Problem: Adult Behavioral Health Plan of Care  Goal: Plan of Care Review  Outcome: Ongoing, Progressing  Flowsheets (Taken 11/7/2022 1040)  Consent Given to Review Plan with: Declines  Progress: no change  Plan of Care Reviewed With: patient  Patient Agreement with Plan of Care: agrees  Outcome Evaluation: new admit. Completed social history and integrated summary  Goal: Patient-Specific Goal (Individualization)  Outcome: Ongoing, Progressing  Flowsheets  Taken 11/7/2022 1040 by Sherine Worley  Patient-Specific Goals (Include Timeframe): Patient will deny SI/HI prior to discharge. Patient will identify 1 healthy coping skill for psychosis prior to discharge. Patient will consent to appropriate aftercare plan prior to discharge.  Individualized Care Needs: Therapist will offer 1-4 individual sessions, family education, aftercare planning  Taken 11/7/2022 1031 by Sherine Worley  Patient Personal Strengths:   expressive of needs   expressive of emotions   motivated for treatment   motivated for recovery   resourceful   resilient   realistic evaluation of current/future capabilities   spiritual/Hoahaoism support  Patient Vulnerabilities:   housing insecurity   limited support system   substance abuse/addiction   lacks insight into illness   poor impulse control   history of unsuccessful treatment   family/relationship conflict  Taken 11/7/2022 0028 by Marcela Payton RN  Anxieties, Fears or Concerns: None verbalized  Goal: Optimized Coping Skills in Response to Life Stressors  Outcome: Ongoing, Progressing  Intervention: Promote Effective Coping Strategies  Flowsheets (Taken 11/7/2022 1040)  Supportive Measures:   active listening utilized   counseling provided  Goal: Develops/Participates in Therapeutic [place] to Support Successful Transition  Outcome: Ongoing, Progressing  Intervention: Foster Therapeutic [place]  Flowsheets (Taken 11/7/2022 1040)  Trust Relationship/Rapport:   care  explained   choices provided   emotional support provided   empathic listening provided   reassurance provided   questions encouraged   questions answered   thoughts/feelings acknowledged  Intervention: Mutually Develop Transition Plan  Flowsheets  Taken 11/7/2022 1040  Outpatient/Agency/Support Group Needs: residential services  Transition Support:   community resources reviewed   crisis management plan verbalized   crisis management plan promoted   follow-up care coordinated   follow-up care discussed  Anticipated Discharge Disposition: residential substance use unit  Taken 11/7/2022 1036  Discharge Coordination/Progress: Patient has [employer] for discharge planning. She has signed consent for [facility] referral.  Concerns Comments: NA  Transportation Anticipated: agency  Transportation Concerns: no car  Current Discharge Risk:   psychiatric illness   substance use/abuse  Concerns to be Addressed:   substance/tobacco abuse/use   mental health   coping/stress   cognitive/perceptual   compliance issue   discharge planning  Readmission Within the Last 30 Days: current reason for admission unrelated to previous admission  Patient/Family Anticipated Services at Transition: rehabilitation services  Patient's Choice of Community Agency(s): [facility]  Patient/Family Anticipates Transition to: inpatient rehabilitation facility  Offered/Gave Vendor List: no   Goal Outcome Evaluation:  Plan of Care Reviewed With: patient  Patient Agreement with Plan of Care: agrees  Consent Given to Review Plan with: Declines  Progress: no change  Outcome Evaluation: new admit. Completed social history and integrated summary
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
## 2022-11-08 VITALS
BODY MASS INDEX: 34.27 KG/M2 | TEMPERATURE: 97.2 F | SYSTOLIC BLOOD PRESSURE: 113 MMHG | OXYGEN SATURATION: 96 % | WEIGHT: 193.4 LBS | HEART RATE: 65 BPM | HEIGHT: 63 IN | DIASTOLIC BLOOD PRESSURE: 63 MMHG | RESPIRATION RATE: 14 BRPM

## 2022-11-08 PROBLEM — R45.851 SUICIDAL IDEATION: Status: RESOLVED | Noted: 2022-11-06 | Resolved: 2022-11-08

## 2022-11-08 PROBLEM — G25.71 AKATHISIA: Status: ACTIVE | Noted: 2022-11-08

## 2022-11-08 LAB
QT INTERVAL: 398 MS
QTC INTERVAL: 462 MS

## 2022-11-08 PROCEDURE — 63710000001 ONDANSETRON PER 8 MG: Performed by: PSYCHIATRY & NEUROLOGY

## 2022-11-08 PROCEDURE — 99239 HOSP IP/OBS DSCHRG MGMT >30: CPT | Performed by: PSYCHIATRY & NEUROLOGY

## 2022-11-08 RX ORDER — MIRTAZAPINE 15 MG/1
7.5 TABLET, FILM COATED ORAL NIGHTLY
Status: DISCONTINUED | OUTPATIENT
Start: 2022-11-08 | End: 2022-11-08 | Stop reason: HOSPADM

## 2022-11-08 RX ORDER — MIRTAZAPINE 7.5 MG/1
7.5 TABLET, FILM COATED ORAL NIGHTLY
Qty: 30 TABLET | Refills: 0 | Status: SHIPPED | OUTPATIENT
Start: 2022-11-08

## 2022-11-08 RX ADMIN — FLUOXETINE HYDROCHLORIDE 40 MG: 20 CAPSULE ORAL at 08:09

## 2022-11-08 RX ADMIN — CYCLOBENZAPRINE 10 MG: 10 TABLET, FILM COATED ORAL at 08:09

## 2022-11-08 RX ADMIN — LOPERAMIDE HYDROCHLORIDE 2 MG: 2 CAPSULE ORAL at 08:09

## 2022-11-08 RX ADMIN — Medication 1 PATCH: at 08:09

## 2022-11-08 RX ADMIN — BUSPIRONE HYDROCHLORIDE 15 MG: 10 TABLET ORAL at 06:12

## 2022-11-08 RX ADMIN — ONDANSETRON HYDROCHLORIDE 4 MG: 4 TABLET, FILM COATED ORAL at 08:09

## 2022-11-08 NOTE — PROGRESS NOTES
"0837:     DATA:    Therapist met with the patient individually in the office, continued reviewing plan of care and aftercare plan.  The patient was agreeable. Therapist staffed with Dr. Lee this date.      ASSESSMENT:    Patient was seen for follow up of suicidal ideation, Schizoaffective disorder, bipolar type, Generalized Anxiety disorder, Opioid use disorder, Methamphetamine use disorder. Cannabis use disorder.     Today, patient was seen 1-1. The patient's affect appeared restricted.  Patient describes mood as \"forward. Moving forward.\" Patient denies SI/HI.  Patient endorses auditory hallucinations, denies command hallucinations.  Patient rates depression/anxiety at 3/10 and reports 8 hours of sleep last night.      Patient reports that she talked to her mother Jayde Trejo and is under the impression that she can go home with her mother and do outpatient. Therapist contacted mother Jayde Trejo at [phone]; Jayde redirected this.  She encouraged patient to continue on with rehab and not to give into home sickness. Patient appeared to hear her mother out and stated that she is agreeable.         CLINICAL MANEUVERING:    Therapist provided safe, secure environment for patient to share.  Provided reflective listening and psychoeducation.  Assisted patient in processing the above session. Assisted patient in identifying any questions or needs to be addressed today.          Plan:     Patient to remain hospitalized this date.      Treatment team will focus efforts on stabilizing patient's acute symptoms while providing education on healthy coping and crisis management to reduce hospitalizations.   Patient requires daily psychiatrist evaluation and 24/7 nursing supervision to promote patient  safety.     Therapist will offer 1-4 individual sessions, family education, and appropriate referral.     Therapist recommends residential [facility] compliance.  Patient accepted back to [facility] and can " return there at discharge.  [facility] to transport. Guardian provided verbal consent.  Patient agreeable.

## 2022-11-08 NOTE — DISCHARGE SUMMARY
PSYCHIATRIC DISCHARGE SUMMARY     Patient Identification:  Name:  Linda Montgomery  Age:  32 y.o.  Sex:  female  :  1990  MRN:  [id number]  Visit Number:  [id number]    Date of Admission:2022   Date of Discharge:  2022    Discharge Diagnosis:  Active Problems:    Schizoaffective disorder, bipolar type (HCC)    Methamphetamine use disorder, severe, dependence (HCC)    Opioid use disorder, severe, dependence (HCC)    Cannabis use disorder, severe, dependence (HCC)    Akathisia      Admission Diagnosis:  Suicidal ideation [R45.851]     Hospital Course  Patient is a 32 y.o. female presented with hallucinations and SI.  Admitted for crisis stabilization.  Patient discharged from this facility 3 days ago to [facility].  Admission UDS positive for methamphetamine and amphetamine, although patient denied use and reports she has been [facility] since discharge on 11/3/2022.  Patient received Abilify Aristada 1064 mg IM 2-month course on 11/3/2022.  She was continued on fluoxetine 40 mg daily, buspirone 15 mg 3 times daily.  Patient requested to be placed back on Suboxone.  However, after contacting [facility], patient had received Sublocade injection on 2022.  Patient stabilized fairly quickly, denying any significant symptom burden and exhibiting no behavior concerning for harm to herself or others.  She reported muscle tension and anxiety, which sound to be akathisia potentially from aripiprazole.  There is some evidence that low-dose mirtazapine can help with these symptoms, so she was started on mirtazapine 7.5 mg nightly.  Patient approved to return to [facility], back to which she will be discharged today.  Next Abilify Aristada 1064 mg IM dose due within a week of 1/3/2023.  Sublocade to be managed by [facility].    On the day of discharge, patient denied SI, HI or AVH. Patient was stable and appropriate by the conclusion of this admission, denying significant  "symptoms of mood, psychotic or thought disorder. Patient showed improvement of presenting symptoms and was deemed appropriate for discharge today.    Mental Status Exam upon discharge:   Mood \"better\"   Affect-congruent, appropriate, stable  Thought Content-goal directed, no delusional material present  Thought process-linear, organized.  Suicidality: No SI  Homicidality: No HI  Perception: No AH/VH    Procedures Performed         Consults:   Consults     No orders found from 10/8/2022 to 11/7/2022.          Pertinent Test Results:   Lab Results (last 7 days)     ** No results found for the last 168 hours. **          Condition on Discharge:  improved    Vital Signs  Temp:  [96.8 °F (36 °C)-97.3 °F (36.3 °C)] 96.8 °F (36 °C)  Heart Rate:  [71-93] 86  Resp:  [18] 18  BP: ()/(58-69) 113/69    Discharge Disposition:  Rehab Facility or Unit (DC - External)    Discharge Medications:     Discharge Medications      New Medications      Instructions Start Date   mirtazapine 7.5 MG tablet  Commonly known as: REMERON   7.5 mg, Oral, Nightly         Continue These Medications      Instructions Start Date   Aristada 1064 MG/3.9ML intramuscular injection  Generic drug: ARIPiprazole Lauroxil ER   1,064 mg, Intramuscular, Every 2 Months      busPIRone 15 MG tablet  Commonly known as: BUSPAR   15 mg, Oral, Every 8 Hours Scheduled      FLUoxetine 40 MG capsule  Commonly known as: PROzac   40 mg, Oral, Daily      Sublocade 300 MG/1.5ML solution prefilled syringe  Generic drug: Buprenorphine ER   300 mg, Subcutaneous, Every 30 Days, Due on 12/3/22             Discharge Diet: Normal    Activity at Discharge: Normal    Follow-up Appointments  No future appointments.      Test Results Pending at Discharge  None     Time: I spent greater than 30 minutes on this discharge activity which included: face-to-face encounter with the patient, reviewing the data in the system, coordination of the care with the nursing staff as well as " consultants, documentation, and entering orders.      Clinician:   Chris Lee MD  11/08/22  10:46 EST

## 2022-11-08 NOTE — PLAN OF CARE
Goal Outcome Evaluation:  Plan of Care Reviewed With: patient  Patient Agreement with Plan of Care: agrees        Pt states anxiety 6, depression 5. She is calm and cooperative with staff, med compliant.

## 2022-11-08 NOTE — PROGRESS NOTES
1011:     Therapist staffed case with Dr. Lee. Patient requesting to return to [facility] today.  Dr. Lee agreeable if facility can accept today. Patient denies SI/HI.  She denies acute symptoms and is future oriented. Thought content appears rational and linear.  She reports intent to give rehab a chance.   Contacted [facility] at [phone]; Spoke with Lion who confirm that patient can return today.  Working on ride for 1 p.m.    Spoke with guardian Jayde Neil at [phone]; obtained verbal consent for patient to return to [facility] today.     Met 1-1 with patient to complete a safety plan.      SAFETY/MENTAL HEALTH PLAN    1. Recognizing warning signs: Warning signs that a crisis may be developing such as thoughts, images, mood, situation, behaviors:    Withdrawn, quiet, isolate.  Bad anxiety.  I start to hear voices. Pace back and forth.       2. Internal coping strategies: Things that the patient can do to take their mind off problems without contacting another person such as relaxation techniques, physical activity, etc:    Listen to music, read a book,  Reading calms me down.  Write mom a letter.     3. Socialization strategies for distraction and support: People and social settings that provide distraction or support - names or places, telephone:     Hang out with peers.        4. Social contact for assistance in resolving crisis: People the patient can ask for help - names and telephone:    Sahara Donohue       5. Professionals or agencies contacts to help resolve crisis: Professionals or agencies the patient can contact during a crisis - clinician name/location/phone/emergency contact number, local urgent care services with address/phone, National Suicide Prevention Lifeline (059), Emergency contact 911:       988, 911, nearest ER     6. Means restriction: Ways to make the environment safe    No access to firearms or weapons.

## 2022-11-10 NOTE — ED PROVIDER NOTES
Subjective   History of Present Illness     Linda is a 33 yo male w/ PMH for bipolar disorder, HepC, substance abuse presenting to the emergency department for hallucinations and SI. Patient was discharged from rehabilitation 3 days prior. She had positive methamphetamine. [facility]. She denies any ethanol use or active drug use. Patient denies any fevers or other infectious like symptoms. Denies any pain.     Review of Systems   Constitutional: Negative.  Negative for fever.   HENT: Negative.    Respiratory: Negative.    Cardiovascular: Negative.  Negative for chest pain.   Gastrointestinal: Negative.  Negative for abdominal pain.   Endocrine: Negative.    Genitourinary: Negative.  Negative for dysuria.   Skin: Negative.    Neurological: Negative.    Psychiatric/Behavioral: Positive for behavioral problems and suicidal ideas.   All other systems reviewed and are negative.      Past Medical History:   Diagnosis Date   • ADHD (attention deficit hyperactivity disorder)    • Bipolar disorder (HCC)    • Depression    • Hepatitis C    • PTSD (post-traumatic stress disorder)    • Schizoaffective disorder (HCC)    • Substance abuse (HCC)    • Suicide attempt (HCC)        No Known Allergies    Past Surgical History:   Procedure Laterality Date   • ARM TENDON REPAIR Left        History reviewed. No pertinent family history.    Social History     Socioeconomic History   • Marital status: Single   • Number of children: 0   • Years of education: GED   • Highest education level: GED or equivalent   Tobacco Use   • Smoking status: Every Day     Packs/day: 0.50     Years: 23.00     Pack years: 11.50     Types: Cigarettes   • Smokeless tobacco: Never   Vaping Use   • Vaping Use: Some days   • Substances: Nicotine, THC, CBD, Flavoring   • Devices: Disposable, Pre-filled or refillable cartridge   Substance and Sexual Activity   • Alcohol use: Not Currently   • Drug use: Not Currently     Types: Heroin,  "Methamphetamines, \"Crack\" cocaine, Amphetamines   • Sexual activity: Defer     Partners: Female     Birth control/protection: None           Objective   Physical Exam  Vitals and nursing note reviewed.   HENT:      Head: Normocephalic and atraumatic.      Right Ear: Tympanic membrane normal.      Left Ear: Tympanic membrane normal.      Nose: Nose normal. No congestion.      Mouth/Throat:      Mouth: Mucous membranes are moist.   Eyes:      Extraocular Movements: Extraocular movements intact.      Pupils: Pupils are equal, round, and reactive to light.   Cardiovascular:      Rate and Rhythm: Normal rate and regular rhythm.      Pulses: Normal pulses.      Heart sounds: Normal heart sounds.   Pulmonary:      Effort: Pulmonary effort is normal.      Breath sounds: Normal breath sounds.   Abdominal:      General: Abdomen is flat. Bowel sounds are normal. There is no distension.   Musculoskeletal:         General: No swelling or tenderness. Normal range of motion.   Skin:     General: Skin is warm.      Capillary Refill: Capillary refill takes less than 2 seconds.   Neurological:      General: No focal deficit present.      Mental Status: She is alert and oriented to person, place, and time.         Procedures           ED Course                                           MDM  Number of Diagnoses or Management Options     Amount and/or Complexity of Data Reviewed  Clinical lab tests: ordered and reviewed  Tests in the radiology section of CPT®: ordered and reviewed  Tests in the medicine section of CPT®: ordered and reviewed  Review and summarize past medical records: yes  Independent visualization of images, tracings, or specimens: yes        Final diagnoses:   Suicidal ideation       ED Disposition  ED Disposition     ED Disposition   DC/Transfer to Behavioral Health    [facility]   --    Comment   --             No follow-up provider specified.       Medication List      No changes were made to your prescriptions " during this visit.          Diane Dixon MD  11/10/22 0689

## 2022-11-17 ENCOUNTER — NURSE TRIAGE (OUTPATIENT)
Dept: CALL CENTER | Facility: HOSPITAL | Age: 32
End: 2022-11-17

## 2022-11-17 NOTE — TELEPHONE ENCOUNTER
Reason for Disposition  • Caller has medicine question only, adult not sick, and triager answers question    Additional Information  • Negative: Intentional drug overdose and suicidal thoughts or ideas  • Negative: Drug overdose and triager unable to answer question  • Negative: Caller requesting a renewal or refill of a medicine patient is currently taking  • Negative: Caller requesting information unrelated to medicine  • Negative: Caller requesting information about COVID-19 Vaccine  • Negative: Caller requesting information about Emergency Contraception  • Negative: Caller requesting information about Combined Birth Control Pills  • Negative: Caller requesting information about Progestin Birth Control Pills  • Negative: Caller requesting information about Post-Op pain or medicines  • Negative: Caller requesting a prescription antibiotic (such as penicillin) for Strep throat and has a positive culture result  • Negative: Caller requesting a prescription anti-viral med (such as Tamiflu) and has influenza (flu) symptoms  • Negative: Immunization reaction suspected  • Negative: Rash while taking a medicine or within 3 days of stopping it  • Negative: Asthma and having symptoms of asthma (cough, wheezing, etc.)  • Negative: Symptom of illness (e.g., headache, abdominal pain, earache, vomiting) that are more than mild  • Negative: Breastfeeding questions about mother's medicines and diet  • Negative: MORE THAN A DOUBLE DOSE of a prescription or over-the-counter (OTC) drug  • Negative: DOUBLE DOSE (an extra dose or lesser amount) of prescription drug and any symptoms (e.g., dizziness, nausea, pain, sleepiness)  • Negative: DOUBLE DOSE (an extra dose or lesser amount) of over-the-counter (OTC) drug and any symptoms (e.g., dizziness, nausea, pain, sleepiness)  • Negative: Took another person's prescription drug  • Negative: DOUBLE DOSE (an extra dose or lesser amount) of prescription drug and NO symptoms  (Exception:  "A double dose of antibiotics.)  • Negative: Diabetes drug error or overdose (e.g., took wrong type of insulin or took extra dose)  • Negative: Caller has medication question about med NOT prescribed by PCP and triager unable to answer question (e.g., compatibility with other med, storage)  • Negative: Prescription not at pharmacy and was prescribed by PCP recently  (Exception: triager has access to EMR and prescription is recorded there. Go to [facility] and confirm for pharmacy.)  • Negative: Pharmacy calling with prescription question and triager unable to answer question  • Negative: Caller has URGENT medicine question about med that PCP or specialist prescribed and triager unable to answer question  • Negative: Caller has NON-URGENT medicine question about med that PCP or specialist prescribed and triager unable to answer question  • Negative: Caller wants to use a complementary or alternative medicine  • Negative: Medicine patch causing local rash or itching  • Negative: Prescription request for new medicine (not a refill)  • Negative: Prescription prescribed recently is not at pharmacy and triager has access to patient's EMR and prescription is recorded in the EMR  • Negative: DOUBLE DOSE (an extra dose or lesser amount) of over-the-counter (OTC) drug and NO symptoms  • Negative: DOUBLE DOSE (an extra dose or lesser amount) of antibiotic drug and NO symptoms    Answer Assessment - Initial Assessment Questions  1. NAME of MEDICATION: \"What medicine are you calling about?\"        Medication  ARIPiprazole Lauroxil ER ([place]) 1064 MG/3.9ML intramuscular injection   Dose: 1,064 mg Route: Intramuscular Frequency: Every 2 Months  Dispense Quantity: 3.9 mL Refills: 0 Fills remainin  Indications of Use: Schizophrenia       Sig: Inject 3.9 mL into the appropriate muscle as directed by prescriber Every 2 (Two) Months. Indications: Schizophrenia  Was given on 11/3/22           2. QUESTION: \"What is your question?\" " "(e.g., double dose of medicine, side effect)      Dose and when given  3. PRESCRIBING HCP: \"Who prescribed it?\" Reason: if prescribed by specialist, call should be referred to that group.      Dr. Lee  4. SYMPTOMS: \"Do you have any symptoms?\"      Schizophrenia  5. SEVERITY: If symptoms are present, ask \"Are they mild, moderate or severe?\"      na  6. PREGNANCY:  \"Is there any chance that you are pregnant?\" \"When was your last menstrual period?\"      na    Protocols used: MEDICATION QUESTION CALL-ADULT-[place]      "